# Patient Record
Sex: MALE | Race: WHITE | NOT HISPANIC OR LATINO | Employment: FULL TIME | URBAN - METROPOLITAN AREA
[De-identification: names, ages, dates, MRNs, and addresses within clinical notes are randomized per-mention and may not be internally consistent; named-entity substitution may affect disease eponyms.]

---

## 2017-03-23 ENCOUNTER — ALLSCRIPTS OFFICE VISIT (OUTPATIENT)
Dept: OTHER | Facility: OTHER | Age: 27
End: 2017-03-23

## 2017-03-23 DIAGNOSIS — E78.5 HYPERLIPIDEMIA: ICD-10-CM

## 2017-03-24 ENCOUNTER — TRANSCRIBE ORDERS (OUTPATIENT)
Dept: LAB | Facility: CLINIC | Age: 27
End: 2017-03-24

## 2017-04-06 ENCOUNTER — ALLSCRIPTS OFFICE VISIT (OUTPATIENT)
Dept: OTHER | Facility: OTHER | Age: 27
End: 2017-04-06

## 2017-07-06 DIAGNOSIS — E78.5 HYPERLIPIDEMIA: ICD-10-CM

## 2017-08-21 ENCOUNTER — ALLSCRIPTS OFFICE VISIT (OUTPATIENT)
Dept: OTHER | Facility: OTHER | Age: 27
End: 2017-08-21

## 2017-08-28 ENCOUNTER — ALLSCRIPTS OFFICE VISIT (OUTPATIENT)
Dept: OTHER | Facility: OTHER | Age: 27
End: 2017-08-28

## 2018-01-10 NOTE — PROGRESS NOTES
Assessment    1  Ingrown toenail (703 0) (L60 0)   2  Asthma (493 90) (J45 909)   3  ADHD (attention deficit hyperactivity disorder) (314 01) (F90 9)   4  Depression (311) (F32 9)   5  Need for prophylactic vaccination and inoculation against influenza (V04 81) (Z23)    Plan  Asthma    · Asmanex 30 Metered Doses 220 MCG/INH Inhalation Aerosol Powder Breath  Activated; One inhalation daily  Depression    · Pristiq 50 MG Oral Tablet Extended Release 24 Hour; 1po dialy  Health Maintenance    · Vyvanse 30 MG Oral Capsule; TAKE 1 CAPSULE DAILY IN THE MORNING  Ingrown toenail    · 2 - Grusso DPM, Sofia Press (Podiatry) Physician Referral  Consult  Status: Active  Requested  for: 15WIE0580  Care Summary provided  : Yes  Need for prophylactic vaccination and inoculation against influenza    · Fluzone Quadrivalent 0 5 ML Intramuscular Suspension  PMH: Asthma with acute exacerbation    · ProAir  (90 Base) MCG/ACT Inhalation Aerosol Solution; 2  PUFFS Q 4-6  HOURS PRN  SALVADOR    Discussion/Summary    #1 dyslipidemia  His 10y risk per [place] is 0%  No need for meds  #2 abnormal thyroid labs  Normal c/ today's result  #3 ADHD  Advised we do not prescribe meds for ADHD here, needs to f/u c/ his psychiatrist    #4 depression  Advised can fell his meds for the Pristiq, but he needs to f/u c/ his psychiatrist   #5 asthma  Needing refill on his meds  doing fine  The treatment plan was reviewed with the patient/guardian  The patient/guardian understands and agrees with the treatment plan   The patient, patient's family was counseled regarding instructions for management  Chief Complaint  Patient here with concerns over recent lab work  bh/lpn      History of Present Illness  26y/o wm presents to office for f/u lab results  he also had some other issues  Rt big toe infected, has ingrown toenail  ADHD, depression - been seeing a psychiatrist for this  was supposed to f/u q 2-3mo's, but not had gone back for 4-5mo's  requesting refill on his meds  Asthma - doing fine on meds  Review of Systems    Constitutional: no fever and no chills  Cardiovascular: no chest pain  Respiratory: no shortness of breath  Active Problems    1  Asthma (493 90) (J45 909)   2  Hyperlipemia (272 4) (E78 5)   3  Pharyngitis (462) (J02 9)    Past Medical History    1  History of Acute bronchitis (466 0) (J20 9)   2  History of Asthma with acute exacerbation (493 92) (J45 901)   3  History of Bronchitis (490) (J40)   4  History of Right knee pain (719 46) (M25 561)   5  History of Sore throat (462) (J02 9)    Family History    1  No pertinent family history    2  No pertinent family history    Social History    · Never A Smoker    Current Meds   1  Asmanex 30 Metered Doses 220 MCG/INH Inhalation Aerosol Powder Breath Activated; One inhalation daily; Therapy: 69DJH4901 to (Last AJ:49WQS7191)  Requested for: 76ILE4123 Ordered   2  CVS Loratadine 10 MG Oral Tablet; TAKE 1 TABLET DAILY; Therapy: 27IVH1297 to Recorded   3  Pristiq 100 MG Oral Tablet Extended Release 24 Hour; Therapy: (Demaris Aidan) to Recorded   4  ProAir  (90 Base) MCG/ACT Inhalation Aerosol Solution; 2  PUFFS Q 4-6 HOURS   PRN  SALVADOR;   Therapy: 49ULD4287 to (Last Rx:89Mnm2779)  Requested for: 24GDD2773 Ordered   5  Vyvanse 30 MG Oral Capsule; TAKE 1 CAPSULE DAILY IN THE MORNING; Therapy: 16CQE9549 to (Evaluate:23Jan2016); Last Rx:02Lon7792 Ordered    Allergies    1  Cefzil TABS    Vitals  Vital Signs [Data Includes: Current Encounter]    Recorded: 77IHA8386 02:31PM   Temperature 98 7 F   Heart Rate 80   Respiration 20   Systolic 295   Diastolic 80   Height 5 ft 8 5 in   Weight 200 lb    BMI Calculated 29 97   BSA Calculated 2 05     Physical Exam    Constitutional   General appearance: No acute distress, well appearing and well nourished  Pulmonary   Respiratory effort: No increased work of breathing or signs of respiratory distress    Respiratory rate: normal  Assessment of respiratory effort revealed normal rhythm and effort, no accessory muscle use, no air hunger and no distress  Skin   Examination for skin lesions: Abnormal   (Rt toe, lat and proximal to the nail, erythematous, tender to touch  ingrown toenail) Skin Lesions:   Psychiatric   Judgment and insight: Normal     Orientation to person, place and time: Normal     Recent and remote memory: Intact      Mood and affect: Normal        Results/Data  Encounter Results   [place] 30GAX3845 02:39PM Heber Rudolph     Test Name Result Flag Reference   [place] - Ten Year 0 %         Signatures   Electronically signed by : ANABELA Rodriguez ; Jan 22 2016  6:40PM EST                       (Author)

## 2018-01-11 NOTE — PROGRESS NOTES
Assessment    1  Difficulty walking (719 7) (R26 2)   2  Ingrowing nail (703 0) (L60 0)   3  Paronychia of toe (681 11) (L03 039)    Plan    · Neomycin-Polymyxin-HC 3 5-16086-2 Otic Suspension; 3 drops tid with bandage  to affected area   · Schedule Surgery Treatment  Procedure  Status: Hold For - Scheduling  Requested for:  47GMA9822   · It is important to take good care of your feet ; Status:Complete;   Done: 33AQO6144  10:21AM   · You can treat and prevent ingrown toenails ; Status:Complete;   Done: 21BDU1552  10:21AM    Discussion/Summary  Possible side effects of new medications were reviewed with the patient/guardian today  The treatment plan was reviewed with the patient/guardian  The patient/guardian understands and agrees with the treatment plan   The patient was counseled regarding diagnostic results, instructions for management, prognosis, patient and family education, risks and benefits of treatment options, importance of compliance with treatment  Chief Complaint  Patient has infection on left great toe for about a month  History of Present Illness  HPI: Patient is seen on referral for chronic ingrown toenail  Agent is presently on antibiotics  He has had multiple episodes of ingrown toenail both big toes      Review of Systems    Constitutional: no fever and no chills  Cardiovascular: no chest pain  Respiratory: no shortness of breath  Active Problems    1  ADHD (attention deficit hyperactivity disorder) (314 01) (F90 9)   2  Asthma (493 90) (J45 909)   3  Depression (311) (F32 9)   4  Hyperlipemia (272 4) (E78 5)   5  Ingrown toenail (703 0) (L60 0)   6  Need for prophylactic vaccination and inoculation against influenza (V04 81) (Z23)   7   Pharyngitis (462) (J02 9)    Past Medical History    · History of Acute bronchitis (466 0) (J20 9)   · History of Asthma with acute exacerbation (493 92) (J45 901)   · History of Bronchitis (490) (J40)   · History of Right knee pain (719 46) (M25 561)   · History of Sore throat (462) (J02 9)    Family History    · No pertinent family history    · No pertinent family history    Social History    · Never A Smoker    Current Meds   1  Asmanex 30 Metered Doses 220 MCG/INH Inhalation Aerosol Powder Breath Activated; One inhalation daily; Therapy: 77QSH2348 to (Last Sydell Clinton)  Requested for: 20DAA9412 Ordered   2  CVS Loratadine 10 MG Oral Tablet; TAKE 1 TABLET DAILY; Therapy: 82HLE4179 to Recorded   3  Fish Oil CAPS; Therapy: (Recorded:80Loj7587) to Recorded   4  Pristiq 50 MG Oral Tablet Extended Release 24 Hour; 1po dialy  Requested for:   17AUL9614; Last Rx:22Jan2016 Ordered   5  ProAir  (90 Base) MCG/ACT Inhalation Aerosol Solution; 2  PUFFS Q 4-6 HOURS   PRN  SALVADOR;   Therapy: 26LXO4286 to (Last Rx:22Jan2016)  Requested for: 38ELN9722 Ordered   6  SMZ-TMP -160 MG Oral Tablet; take 1 tablet twice daily with food; Therapy: 18LIW1939 to (Evaluate:77Afx0842)  Requested for: 43MGY9704; Last   Rx:27Jan2016 Ordered   7  Vitamin D TABS; Therapy: (Recorded:05Feb2016) to Recorded   8  Vyvanse 30 MG Oral Capsule; TAKE 1 CAPSULE DAILY IN THE MORNING; Therapy: 23YHS0549 to (Evaluate:18Ejg7125); Last Rx:26Jan2016 Ordered    Allergies    1  Cefzil TABS    Vitals   Recorded: 49ESS5455 29:21VE   Systolic 175   Diastolic 73   Height 5 ft 8 5 in   Weight 200 lb    BMI Calculated 29 97   BSA Calculated 2 05     Physical Exam  Left Foot: Appearance: Normal except as noted: excessive pronation and pes planus  Tenderness: None except the great toe  Right Foot: Appearance: Normal except as noted: pes planus  Tenderness: None except the great toe  Neurological Exam: performed  Light touch was intact bilaterally  Vibratory sensation was intact bilaterally  Response to monofilament test was intact bilaterally  Deep tendon reflexes: patellar reflex present bilaterally and achilles reflex present bilaterally     Vascular Exam: performed Dorsalis pedis pulses were present bilaterally  Posterior tibial pulses were present bilaterally  Elevation Pallor: absent bilaterally  Dependence rubor was absent bilaterally  Edema: none  Toenails: All of the toenails were elongated and hypertrophied  Both first toenails were shown to have subungual debris, but not ingrown  Hyperkeratosis: present on both first toes        Procedure  patient will need nail procedure      Signatures   Electronically signed by : Olamide Smiley DPM; Feb 5 2016 10:22AM EST                       (Author)

## 2018-01-12 VITALS
HEIGHT: 69 IN | TEMPERATURE: 98.7 F | BODY MASS INDEX: 28.44 KG/M2 | DIASTOLIC BLOOD PRESSURE: 90 MMHG | SYSTOLIC BLOOD PRESSURE: 130 MMHG | OXYGEN SATURATION: 98 % | WEIGHT: 192 LBS | HEART RATE: 84 BPM

## 2018-01-12 NOTE — PROGRESS NOTES
Chief Complaint  Patient here from [facility] for PPD placement  Placed in left forearm      Active Problems    1  ADHD (attention deficit hyperactivity disorder) (314 01) (F90 9)   2  Asthma (493 90) (J45 909)   3  Depression (311) (F32 9)   4  Difficulty walking (719 7) (R26 2)   5  Encounter for PPD test (V74 1) (Z11 1)   6  Hyperlipemia (272 4) (E78 5)   7  Ingrowing nail (703 0) (L60 0)   8  Ingrown toenail (703 0) (L60 0)   9  Need for prophylactic vaccination and inoculation against influenza (V04 81) (Z23)   10  Paronychia of toe (681 11) (L03 039)   11  Pharyngitis (462) (J02 9)    Current Meds   1  Asmanex 30 Metered Doses 220 MCG/INH Inhalation Aerosol Powder Breath Activated; One inhalation daily; Therapy: 77VFU4028 to (Last Ja Reges)  Requested for: 83ISM4722 Ordered   2  CVS Loratadine 10 MG Oral Tablet; TAKE 1 TABLET DAILY; Therapy: 32IIL4103 to Recorded   3  Fish Oil CAPS; Therapy: (Recorded:27Dvj5254) to Recorded   4  Mupirocin 2 % External Ointment; APPLY 1 INCH Daily; Therapy: 39IHY5461 to (Complete:30Mar2016)  Requested for: 96PXZ3105; Last   Rx:59Utb8001 Ordered   5  Pristiq 100 MG Oral Tablet Extended Release 24 Hour; 1po daily  Requested for:   15RZP9004; Last Rx:53Hqt7175 Ordered   6  ProAir  (90 Base) MCG/ACT Inhalation Aerosol Solution; 2  PUFFS Q 4-6   HOURS PRN  SALVADOR;   Therapy: 91UBS3127 to (Last Rx:22Jan2016)  Requested for: 10CCG9140 Ordered   7  SMZ-TMP -160 MG Oral Tablet; take 1 tablet twice daily with food; Therapy: 79JQB9157 to (Evaluate:26Upe5376)  Requested for: 00AIG3785; Last   Rx:47Xva5315 Ordered   8  Vitamin D TABS; Therapy: (Recorded:24Zci2463) to Recorded   9  Vyvanse 30 MG Oral Capsule; TAKE 1 CAPSULE DAILY IN THE MORNING; Therapy: 74LRS2058 to (Evaluate:89Oft5643); Last Rx:26Jan2016 Ordered    Allergies    1  Cefzil TABS    Assessment    1   Encounter for PPD test (V74 1) (Z11 1)    Plan  Encounter for PPD test    · PPD    Future Appointments    Date/Time Provider Specialty Site   03/04/2016 01:00 PM ANABELA Cortez   Family Medicine [address]     Signatures   Electronically signed by : ANABELA Ahumada ; Feb 23 2016 10:57AM EST                       (Author)

## 2018-01-13 NOTE — PROGRESS NOTES
Chief Complaint  Patient here for PPD read, reading negative 0 00mm bh/lpn      Active Problems    1  ADHD (attention deficit hyperactivity disorder) (314 01) (F90 9)   2  Asthma (493 90) (J45 909)   3  Depression (311) (F32 9)   4  Difficulty walking (719 7) (R26 2)   5  Encounter for PPD test (V74 1) (Z11 1)   6  Hyperlipemia (272 4) (E78 5)   7  Ingrowing nail (703 0) (L60 0)   8  Ingrown toenail (703 0) (L60 0)   9  Need for prophylactic vaccination and inoculation against influenza (V04 81) (Z23)   10  Paronychia of toe (681 11) (L03 039)   11  Pharyngitis (462) (J02 9)    Current Meds   1  Asmanex 30 Metered Doses 220 MCG/INH Inhalation Aerosol Powder Breath Activated; One inhalation daily; Therapy: 98CXW6311 to (Last Thanh Piedad)  Requested for: 63ZEO9638 Ordered   2  CVS Loratadine 10 MG Oral Tablet; TAKE 1 TABLET DAILY; Therapy: 52PIB7805 to Recorded   3  Fish Oil CAPS; Therapy: (Recorded:46Bvx8138) to Recorded   4  Mupirocin 2 % External Ointment; APPLY 1 INCH Daily; Therapy: 54QDQ9455 to (Complete:30Mar2016)  Requested for: 36YXZ4833; Last   Rx:31Ril1514 Ordered   5  Pristiq 100 MG Oral Tablet Extended Release 24 Hour; 1po daily  Requested for:   17XLQ0538; Last Rx:09Tvf7037 Ordered   6  ProAir  (90 Base) MCG/ACT Inhalation Aerosol Solution; 2  PUFFS Q 4-6   HOURS PRN  SALVADOR;   Therapy: 89WUL8081 to (Last Rx:22Jan2016)  Requested for: 76ZUF9813 Ordered   7  SMZ-TMP -160 MG Oral Tablet; take 1 tablet twice daily with food; Therapy: 43TIN4099 to (Evaluate:72Aia7416)  Requested for: 32BNS8900; Last   Rx:00Koh3791 Ordered   8  Vitamin D TABS; Therapy: (Recorded:20Sgy0874) to Recorded   9  Vyvanse 30 MG Oral Capsule; TAKE 1 CAPSULE DAILY IN THE MORNING; Therapy: 31XSE6058 to (Evaluate:46Sni0727); Last Rx:26Jan2016 Ordered    Allergies    1   Cefzil TABS    Plan  Encounter for PPD test    · PPD    Future Appointments    Date/Time Provider Specialty Site   03/04/2016 01:00 PM ANABELA Pickett   Family Medicine [address]     Signatures   Electronically signed by : ANABELA Wade ; Feb 25 2016  1:00PM EST                       (Author)

## 2018-01-14 VITALS
BODY MASS INDEX: 29.18 KG/M2 | OXYGEN SATURATION: 98 % | SYSTOLIC BLOOD PRESSURE: 108 MMHG | HEIGHT: 69 IN | WEIGHT: 197 LBS | HEART RATE: 76 BPM | DIASTOLIC BLOOD PRESSURE: 60 MMHG

## 2018-01-14 NOTE — MISCELLANEOUS
Message  Return to work or school:  08/21/2017   He is able to return to work on  08/25/2017       [facility]  Signatures   Electronically signed by : ANITA Alanis; Aug 21 2017 10:35AM EST                       (Author)    Electronically signed by : ANITA Alanis;  Aug 23 2017  8:53AM EST                       (Author)

## 2018-01-15 NOTE — MISCELLANEOUS
Message   Recorded as Task   Date: 08/21/2017 10:58 AM, Created By: Sanjana Rojas   Task Name: Med Renewal Request   Assigned To: Mee Resendiz   Regarding Patient: Celine Bell, Status: Active   CommentCarrieliliam WestbrookCurtis - 21 Aug 2017 10:58 AM     TASK CREATED  Caller: Self; Renew Medication; [phone] (Home); [phone] (Work)  PT CALLED -CHKED MED CHART- [facility] DID NOT [address] [facility] TO PHARMACY     CAN VICENTE Cowan - 21 Aug 2017 11:05 AM     TASK EDITED  Re-sent all medications  Ellie Rondon - 21 Aug 2017 11:05 AM     TASK COMPLETED   Rimma Diehl - 23 Aug 2017 8:09 AM     TASK REACTIVATED   Rimma Diehl - 23 Aug 2017 8:10 AM     TASK REASSIGNED: Previously Assigned To Ellie Rondon  PT IS STILL EXPERIENCING SAME COUGH AND GETTNG WORSE WOULD LIKE SOMETING ELSE TO TAKE INSTEAD OF WHAT WAS CALLED IN? T/c to patient at [phone]  Report still coughing and unable to sleep due to cough  Started all medications on Monday as prescribed  To stop Tessalon Perles and begin Promethazine with Codeine  Will come pit of medication today  Work note extended until Friday  Plan  Acute bronchitis    · Benzonatate 100 MG Oral Capsule (Tessalon Perles)   · Promethazine-Codeine 6 25-10 MG/5ML Oral Syrup; TAKE 5 ML EVERY 4 TO 6  HOURS AS NEEDED FOR COUGH  Moderate persistent asthma with acute exacerbation    · Follow-up visit in 1 week Evaluation and Treatment  Follow-up  Status: Complete  Done:  25Lqd6565    Signatures   Electronically signed by : ANITA Salazar;  Aug 23 2017  8:56AM EST                       (Author)

## 2018-01-16 NOTE — PROGRESS NOTES
Assessment    1  ADHD (attention deficit hyperactivity disorder) (314 01) (F90 9)   2  Depression (311) (F32 9)    Plan  ADHD (attention deficit hyperactivity disorder), Depression    · Psychiatry Referral Other Physician Referral  Consult  Status: Active  Requested for:  53OSL3014  are Referring to a non- Preferred Provider : Scheduling access issues  Care Summary provided  : Yes  Depression    · Pristiq 100 MG Oral Tablet Extended Release 24 Hour; 1po daily  Health Maintenance    · Vyvanse 30 MG Oral Capsule; TAKE 1 CAPSULE DAILY IN THE MORNING    Discussion/Summary    Patient advised that our practice does not manage ADHD and he will need to find a psychiatrist to manage his Vyvanse  I will give him a one-month supply for the time being  No further refills will be given  He should reach out to his insurance company for a list of psychiatrists in his area  Patient verbalizes understanding and agreement of the same  I advised him that the psychiatrist can also manage his Pristiq  We can manage his other medications  Possible side effects of new medications were reviewed with the patient/guardian today  The treatment plan was reviewed with the patient/guardian  The patient/guardian understands and agrees with the treatment plan   The patient was counseled regarding instructions for management, risk factor reductions, patient and family education, impressions, risks and benefits of treatment options, importance of compliance with treatment  Chief Complaint  F/U depression and maintenance medications; flu inj  kss,cma      History of Present Illness  The patient states his depression has depression and ADHD well managed on vyvanse and pristiq, but been stable since the last visit  Comorbid Illnesses: ADHD  He has had no significant interval events  Interval Symptoms: The patient is currently asymptomatic  Associated symptoms include:  No associated symptoms are reported     Social Support: the patient has good social support  Medications: the patient is adherent with his medication regimen  He denies medication side effects  Medication(s): a SNRI  Review of Systems    Constitutional: feeling tired, but no fever and no chills  Cardiovascular: No complaints of slow heart rate, no fast heart rate, no chest pain, no palpitations, no leg claudication, no lower extremity  Respiratory: No complaints of shortness of breath, no wheezing, no cough, no SOB on exertion, no orthopnea or PND  Integumentary: no rashes  Neurological: No compliants of headache, no confusion, no convulsions, no numbness or tingling, no dizziness or fainting, no limb weakness, no difficulty walking  Psychiatric: Is not suicidal, no sleep disturbances, no anxiety or depression, no change in personality, no emotional problems  Over the past 2 weeks, how often have you been bothered by the following problems? 1 ) Little interest or pleasure in doing things? Several days  2 ) Feeling down, depressed or hopeless? Several days  3 ) Trouble falling asleep or sleeping too much? Not at all    4 ) Feeling tired or having little energy? Several days  5 ) Poor appetite or overeating? Not at all    6 ) Feeling bad about yourself, or that you are a failure, or have let yourself or your family down? Not at all    7 ) Trouble concentrating on things, such as reading a newspaper or watching television? Not at all    8 ) Moving or speaking so slowly that other people could have noticed, or the opposite, moving or speaking faster than usual? Not at all    9 ) Thoughts that you would be better off dead or of hurting yourself in some way? Not at all  Score 3      Active Problems    1  ADHD (attention deficit hyperactivity disorder) (314 01) (F90 9)   2  Adult body mass index 28 0-28 9 (V85 24) (Z68 28)   3  Asthma (493 90) (J45 909)   4  Depression (311) (F32 9)   5  Difficulty walking (719 7) (R26 2)   6   Encounter for PPD test (V74 1) (Z11 1)   7  Hyperlipemia (272 4) (E78 5)   8  Ingrowing nail (703 0) (L60 0)   9  Ingrown toenail (703 0) (L60 0)   10  Paronychia of toe (681 11) (L03 039)    Past Medical History    1  History of Acute bronchitis (466 0) (J20 9)   2  History of Asthma with acute exacerbation (493 92) (J45 901)   3  History of Bronchitis (490) (J40)   4  History of pharyngitis (V12 69) (Z87 09)   5  History of Right knee pain (719 46) (M25 561)   6  History of Sore throat (462) (J02 9)    The active problems and past medical history were reviewed and updated today  Family History  Mother    1  No pertinent family history  Father    2  No pertinent family history    The family history was reviewed and updated today  Social History    · Never A Smoker  The social history was reviewed and updated today  Current Meds   1  Asmanex 30 Metered Doses 220 MCG/INH Inhalation Aerosol Powder Breath Activated; One inhalation daily; Therapy: 71AQB5965 to (Last Ezzard Aus)  Requested for: 71CRH5234 Ordered   2  CVS Loratadine 10 MG Oral Tablet; TAKE 1 TABLET DAILY; Therapy: 22UPU7239 to Recorded   3  Fish Oil CAPS; Therapy: (Recorded:05Feb2016) to Recorded   4  Pristiq 100 MG Oral Tablet Extended Release 24 Hour; 1po daily  Requested for:   08Apr2016; Last Rx:08Apr2016 Ordered   5  Pristiq 100 MG Oral Tablet Extended Release 24 Hour; 1po daily; Last Rx:04Mar2016   Ordered   6  ProAir  (90 Base) MCG/ACT Inhalation Aerosol Solution; 2  PUFFS Q 4-6 HOURS   PRN  SALVADOR;   Therapy: 94CWC6993 to (Last Rx:22Jan2016)  Requested for: 28ADX8641 Ordered   7  SMZ-TMP -160 MG TABS; take 1 tablet twice daily with food; Therapy: 99XSX8128 to (Evaluate:30Ico6122)  Requested for: 51RPN3191; Last   Rx:43Gvn3601 Ordered   8  Vitamin D TABS; Therapy: (Recorded:61Gtk6078) to Recorded   9  Vyvanse 30 MG Oral Capsule; TAKE 1 CAPSULE DAILY IN THE MORNING; Therapy: 65OSC2136 to (Evaluate:35Vhj2442);  Last Compa Earnest Ordered    The medication list was reviewed and updated today  Allergies    1  Cefzil TABS    Vitals  Vital Signs    Recorded: 66TNE9190 07:39PX   Systolic 698   Diastolic 80   Heart Rate 76   Respiration 16   Temperature 97 8 F   Height 5 ft 9 25 in   Weight 199 lb    BMI Calculated 29 17   BSA Calculated 2 07     Physical Exam    Constitutional   General appearance: No acute distress, well appearing and well nourished  Eyes   Conjunctiva and lids: No swelling, erythema, or discharge  Pupils and irises: Equal, round and reactive to light  Pulmonary   Respiratory effort: No increased work of breathing or signs of respiratory distress  Auscultation of lungs: Clear to auscultation, equal breath sounds bilaterally, no wheezes, no rales, no rhonci  Cardiovascular   Auscultation of heart: Normal rate and rhythm, normal S1 and S2, without murmurs  Musculoskeletal   Gait and station: Normal     Skin   Skin and subcutaneous tissue: Normal without rashes or lesions  Psychiatric   Orientation to person, place and time: Normal     Mood and affect: Normal          Attending Note  Collaborating Note: I agree with the Advanced Practitioner note        Signatures   Electronically signed by : MARCO Ford; Sep  9 2016  1:24PM EST                       (Author)    Electronically signed by : Aurora Matos DO; Sep  9 2016  1:30PM EST                       (Author)

## 2018-01-18 NOTE — PROGRESS NOTES
Assessment    1  Encounter for preventive health examination (V70 0) (Z00 00)   2  Adult body mass index 28 0-28 9 (V85 24) (Z68 28)    Plan  Depression    · Pristiq 100 MG Oral Tablet Extended Release 24 Hour; 1po daily   · Pristiq 100 MG Oral Tablet Extended Release 24 Hour; 1po daily  Health Maintenance    · Vyvanse 30 MG Oral Capsule; TAKE 1 CAPSULE DAILY IN THE MORNING    Discussion/Summary  Impression: health maintenance visit  Currently, he eats a poor diet  Advice and education were given regarding nutrition, aerobic exercise, weight bearing exercise and weight loss  Patient discussion: discussed with the patient  Patient will seek psychiatric help in his new residence  Patient is overweight  Diet and exercise recommended  Reviewed recent blood tests done February 2016 including cholesterol level  The patient was counseled regarding diagnostic results, instructions for management, risk factor reductions, prognosis, risks and benefits of treatment options, importance of compliance with treatment  Chief Complaint  here for physical      History of Present Illness  HM, Adult Male: The patient is being seen for a health maintenance evaluation  The last health maintenance visit was 1 year(s) ago  Social History: He is unmarried  Work status: working full time  The patient has never smoked cigarettes  He reports rare alcohol use  He has never used illicit drugs  General Health: The patient's health since the last visit is described as fair  He does not have regular dental visits  He denies vision problems  He denies hearing loss  Immunizations status: up to date  Lifestyle:  He does not have a healthy diet  He has weight concerns  He does not exercise regularly  He does not use tobacco  He consumes alcohol  He denies drug use  Reproductive health:  the patient is not sexually active     Screening:      Review of Systems    Constitutional: No fever or chills, feels well, no tiredness, no recent weight gain or weight loss, no recent weight gain and no recent weight loss  Eyes: No complaints of eye pain, no red eyes, no discharge from eyes, no itchy eyes  ENT: no complaints of earache, no hearing loss, no nosebleeds, no nasal discharge, no sore throat, no hoarseness  Cardiovascular: No complaints of slow heart rate, no fast heart rate, no chest pain, no palpitations, no leg claudication, no lower extremity  Respiratory: No complaints of shortness of breath, no wheezing, no cough, no SOB on exertion, no orthopnea or PND  Gastrointestinal: blood on toilet paper today  Genitourinary: No complaints of dysuria, no incontinence, no hesitancy, no nocturia, no genital lesion, no testicular pain  Musculoskeletal: No complaints of arthralgia, no myalgias, no joint swelling or stiffness, no limb pain or swelling  Integumentary: No complaints of skin rash or skin lesions, no itching, no skin wound, no dry skin  Neurological: no headache, no confusion, no convulsions and no difficulty walking  Psychiatric: depression and has no active psych  Endocrine: No complaints of proptosis, no hot flashes, no muscle weakness, no erectile dysfunction, no deepening of the voice, no feelings of weakness  Hematologic/Lymphatic: No complaints of swollen glands, no swollen glands in the neck, does not bleed easily, no easy bruising  Active Problems    1  ADHD (attention deficit hyperactivity disorder) (314 01) (F90 9)   2  Asthma (493 90) (J45 909)   3  Depression (311) (F32 9)   4  Difficulty walking (719 7) (R26 2)   5  Encounter for PPD test (V74 1) (Z11 1)   6  Hyperlipemia (272 4) (E78 5)   7  Ingrowing nail (703 0) (L60 0)   8  Ingrown toenail (703 0) (L60 0)   9  Need for prophylactic vaccination and inoculation against influenza (V04 81) (Z23)   10   Paronychia of toe (681 11) (L03 039)    Past Medical History    · History of Acute bronchitis (466 0) (J20 9)   · History of Asthma with acute exacerbation (493 92) (J45 901)   · History of Bronchitis (490) (J40)   · History of pharyngitis (V12 69) (Z87 09)   · History of Right knee pain (719 46) (M25 561)   · History of Sore throat (462) (J02 9)    Family History    · No pertinent family history    · No pertinent family history    Social History    · Never A Smoker    Current Meds   1  Asmanex 30 Metered Doses 220 MCG/INH Inhalation Aerosol Powder Breath Activated; One inhalation daily; Therapy: 72GPL0630 to (Last Kay Flower)  Requested for: 66GMK9352 Ordered   2  [facility] Loratadine 10 MG Oral Tablet; TAKE 1 TABLET DAILY; Therapy: 56OQN2168 to Recorded   3  Fish Oil CAPS; Therapy: (Recorded:05Feb2016) to Recorded   4  Mupirocin 2 % External Ointment; APPLY 1 INCH Daily; Therapy: 19JPI6504 to (Complete:30Mar2016)  Requested for: 39THK5607; Last   Rx:[id number] Ordered   5  Pristiq 100 MG Oral Tablet Extended Release 24 Hour; 1po daily  Requested for:   92GYK9260; Last Rx:17Taa6058 Ordered   6  ProAir  (90 Base) MCG/ACT Inhalation Aerosol Solution; 2  PUFFS Q 4-6   HOURS PRN  SALVADOR;   Therapy: 35EDN9821 to (Last Rx:22Jan2016)  Requested for: 22PUM8592 Ordered   7  SMZ-TMP -160 MG Oral Tablet; take 1 tablet twice daily with food; Therapy: 01WPL9376 to (Evaluate:36Bkb3723)  Requested for: 97NYR4537; Last   Rx:19Feb2016 Ordered   8  Vitamin D TABS; Therapy: (Recorded:35Ski7189) to Recorded   9  Vyvanse 30 MG Oral Capsule; TAKE 1 CAPSULE DAILY IN THE MORNING; Therapy: 53FYU1959 to (Evaluate:42Afx4290); Last Rx:26Jan2016 Ordered    Allergies    1  Cefzil TABS    Vitals   Recorded: 31YLD3789 01:12PM   Temperature 97 7 F   Heart Rate 80   Systolic 771   Diastolic 88   Height 5 ft 9 25 in   Weight 196 lb    BMI Calculated 28 73   BSA Calculated 2 05     Physical Exam    Constitutional   General appearance: Abnormal   comfortable and overweight  Eyes   Conjunctiva and lids: No erythema, swelling or discharge      Pupils and irises: Equal, round, reactive to light  Ophthalmoscopic examination: Normal fundi and optic discs  Ears, Nose, Mouth, and Throat   External inspection of ears and nose: Normal     Otoscopic examination: Tympanic membranes translucent with normal light reflex  Canals patent without erythema  Hearing: Normal     Nasal mucosa, septum, and turbinates: Normal without edema or erythema  Lips, teeth, and gums: Normal, good dentition  Oropharynx: Normal with no erythema, edema, exudate or lesions  Neck   Neck: Supple, symmetric, trachea midline, no masses  Thyroid: Normal, no thyromegaly  Pulmonary   Respiratory effort: No increased work of breathing or signs of respiratory distress  Percussion of chest: Normal     Palpation of chest: Normal     Auscultation of lungs: Clear to auscultation  Cardiovascular   Palpation of heart: Normal PMI, no thrills  Auscultation of heart: Normal rate and rhythm, normal S1 and S2, no murmurs  Carotid pulses: 2+ bilaterally  Abdominal aorta: Normal     Pedal pulses: 2+ bilaterally  Examination of extremities for edema and/or varicosities: Normal     Chest   Breasts: Normal, no dimpling or skin changes appreciated  Palpation of breasts and axillae: Normal, no masses palpated  Chest: Normal     Abdomen   Abdomen: Non-tender, no masses  Liver and spleen: No hepatomegaly or splenomegaly  Examination for hernias: No hernias appreciated  No right inguinal hernia was palpated  No left inguinal hernia was palpated  Anus, perineum, and rectum: Normal sphincter tone, no masses, no prolapse  Stool sample for occult blood: Negative  Genitourinary   Scrotal contents: Normal testes, no masses  Penis: Normal, no lesions  Examination of the circumcised penis showed  Lymphatic   Palpation of lymph nodes in neck: No lymphadenopathy  Palpation of lymph nodes in axillae: No lymphadenopathy      Musculoskeletal   Gait and station: Normal  Inspection/palpation of digits and nails: Normal without clubbing or cyanosis  Inspection/palpation of joints, bones, and muscles: Normal     Muscle strength/tone: Normal     Skin   Skin and subcutaneous tissue: Normal without rashes or lesions  Neurologic   Cranial nerves: Cranial nerves 2-12 intact  Reflexes: 2+ and symmetric  Coordination: Normal finger to nose and heel to shin      Psychiatric   Judgment and insight: Normal     Mood and affect: Normal        Future Appointments    Date/Time Provider Specialty Site   09/06/2016 01:30 PM Trino Henriquez,  Internal Medicine [address]     Signatures   Electronically signed by : ANABELA Barajas ; Mar  4 2016  2:47PM EST                       (Author)

## 2018-01-22 VITALS
HEART RATE: 85 BPM | SYSTOLIC BLOOD PRESSURE: 118 MMHG | OXYGEN SATURATION: 98 % | DIASTOLIC BLOOD PRESSURE: 70 MMHG | WEIGHT: 198 LBS | TEMPERATURE: 98.4 F | BODY MASS INDEX: 29.33 KG/M2 | HEIGHT: 69 IN

## 2020-04-07 ENCOUNTER — TELEMEDICINE (OUTPATIENT)
Dept: FAMILY MEDICINE CLINIC | Facility: CLINIC | Age: 30
End: 2020-04-07
Payer: COMMERCIAL

## 2020-04-07 VITALS — TEMPERATURE: 98.6 F | HEIGHT: 68 IN | BODY MASS INDEX: 30.69 KG/M2 | WEIGHT: 202.5 LBS

## 2020-04-07 DIAGNOSIS — R50.9 FEVER, UNSPECIFIED FEVER CAUSE: ICD-10-CM

## 2020-04-07 DIAGNOSIS — J45.909 UNCOMPLICATED ASTHMA, UNSPECIFIED ASTHMA SEVERITY, UNSPECIFIED WHETHER PERSISTENT: Primary | ICD-10-CM

## 2020-04-07 PROCEDURE — 3008F BODY MASS INDEX DOCD: CPT | Performed by: FAMILY MEDICINE

## 2020-04-07 PROCEDURE — 99213 OFFICE O/P EST LOW 20 MIN: CPT | Performed by: FAMILY MEDICINE

## 2020-04-07 RX ORDER — ALBUTEROL SULFATE 2.5 MG/3ML
2.5 SOLUTION RESPIRATORY (INHALATION) EVERY 6 HOURS PRN
Qty: 25 VIAL | Refills: 3 | Status: SHIPPED | OUTPATIENT
Start: 2020-04-07

## 2020-04-07 RX ORDER — ALBUTEROL SULFATE 2.5 MG/3ML
2.5 SOLUTION RESPIRATORY (INHALATION) EVERY 6 HOURS PRN
COMMUNITY
End: 2020-04-07 | Stop reason: SDUPTHER

## 2020-04-07 RX ORDER — ALBUTEROL SULFATE 90 UG/1
2 AEROSOL, METERED RESPIRATORY (INHALATION) EVERY 6 HOURS PRN
COMMUNITY
End: 2020-04-07 | Stop reason: SDUPTHER

## 2020-04-07 RX ORDER — DESVENLAFAXINE 100 MG/1
100 TABLET, EXTENDED RELEASE ORAL DAILY
COMMUNITY

## 2020-04-07 RX ORDER — ALBUTEROL SULFATE 90 UG/1
2 AEROSOL, METERED RESPIRATORY (INHALATION) EVERY 6 HOURS PRN
Qty: 1 INHALER | Refills: 1 | Status: SHIPPED | OUTPATIENT
Start: 2020-04-07 | End: 2021-11-30

## 2020-12-10 ENCOUNTER — OFFICE VISIT (OUTPATIENT)
Dept: FAMILY MEDICINE CLINIC | Facility: CLINIC | Age: 30
End: 2020-12-10
Payer: COMMERCIAL

## 2020-12-10 VITALS
OXYGEN SATURATION: 97 % | DIASTOLIC BLOOD PRESSURE: 64 MMHG | HEART RATE: 75 BPM | BODY MASS INDEX: 30.19 KG/M2 | TEMPERATURE: 97.3 F | HEIGHT: 68 IN | WEIGHT: 199.2 LBS | SYSTOLIC BLOOD PRESSURE: 116 MMHG

## 2020-12-10 DIAGNOSIS — Z13.220 ENCOUNTER FOR SCREENING FOR LIPID DISORDER: ICD-10-CM

## 2020-12-10 DIAGNOSIS — R10.9 FLANK PAIN: Primary | ICD-10-CM

## 2020-12-10 PROCEDURE — 1036F TOBACCO NON-USER: CPT | Performed by: FAMILY MEDICINE

## 2020-12-10 PROCEDURE — 3008F BODY MASS INDEX DOCD: CPT | Performed by: FAMILY MEDICINE

## 2020-12-10 PROCEDURE — 99214 OFFICE O/P EST MOD 30 MIN: CPT | Performed by: FAMILY MEDICINE

## 2020-12-10 RX ORDER — LORATADINE 10 MG/1
1 TABLET ORAL DAILY
COMMUNITY
Start: 2012-10-17

## 2020-12-10 RX ORDER — MOMETASONE FUROATE 220 UG/1
INHALANT RESPIRATORY (INHALATION) DAILY
COMMUNITY
Start: 2015-01-23

## 2020-12-10 RX ORDER — VITAMIN B COMPLEX
TABLET ORAL
COMMUNITY

## 2020-12-22 LAB
ALBUMIN SERPL-MCNC: 4.4 G/DL (ref 4–5)
ALBUMIN/GLOB SERPL: 2.1 {RATIO} (ref 1.2–2.2)
ALP SERPL-CCNC: 65 IU/L (ref 39–117)
ALT SERPL-CCNC: 30 IU/L (ref 0–44)
APPEARANCE UR: CLEAR
AST SERPL-CCNC: 22 IU/L (ref 0–40)
BACTERIA URNS QL MICRO: NORMAL
BILIRUB SERPL-MCNC: 0.6 MG/DL (ref 0–1.2)
BILIRUB UR QL STRIP: NEGATIVE
BUN SERPL-MCNC: 13 MG/DL (ref 6–20)
BUN/CREAT SERPL: 13 (ref 9–20)
CALCIUM SERPL-MCNC: 9.1 MG/DL (ref 8.7–10.2)
CHLORIDE SERPL-SCNC: 101 MMOL/L (ref 96–106)
CHOLEST SERPL-MCNC: 201 MG/DL (ref 100–199)
CO2 SERPL-SCNC: 25 MMOL/L (ref 20–29)
COLOR UR: YELLOW
CREAT SERPL-MCNC: 1.01 MG/DL (ref 0.76–1.27)
EPI CELLS #/AREA URNS HPF: NORMAL /HPF (ref 0–10)
GLOBULIN SER-MCNC: 2.1 G/DL (ref 1.5–4.5)
GLUCOSE SERPL-MCNC: 100 MG/DL (ref 65–99)
GLUCOSE UR QL: NEGATIVE
HDLC SERPL-MCNC: 37 MG/DL
HGB UR QL STRIP: NEGATIVE
KETONES UR QL STRIP: NEGATIVE
LDLC SERPL CALC-MCNC: 128 MG/DL (ref 0–99)
LEUKOCYTE ESTERASE UR QL STRIP: NEGATIVE
MICRO URNS: NORMAL
MICRO URNS: NORMAL
MUCOUS THREADS URNS QL MICRO: PRESENT
NITRITE UR QL STRIP: NEGATIVE
PH UR STRIP: 6 [PH] (ref 5–7.5)
POTASSIUM SERPL-SCNC: 4 MMOL/L (ref 3.5–5.2)
PROT SERPL-MCNC: 6.5 G/DL (ref 6–8.5)
PROT UR QL STRIP: NEGATIVE
RBC #/AREA URNS HPF: NORMAL /HPF (ref 0–2)
SL AMB EGFR AFRICAN AMERICAN: 114 ML/MIN/1.73
SL AMB EGFR NON AFRICAN AMERICAN: 99 ML/MIN/1.73
SL AMB URINALYSIS REFLEX: NORMAL
SL AMB VLDL CHOLESTEROL CALC: 36 MG/DL (ref 5–40)
SODIUM SERPL-SCNC: 138 MMOL/L (ref 134–144)
SP GR UR: 1.01 (ref 1–1.03)
TRIGL SERPL-MCNC: 204 MG/DL (ref 0–149)
TSH SERPL DL<=0.005 MIU/L-ACNC: 0.44 UIU/ML (ref 0.45–4.5)
URATE SERPL-MCNC: 6.1 MG/DL (ref 3.8–8.4)
UROBILINOGEN UR STRIP-ACNC: 0.2 MG/DL (ref 0.2–1)
WBC #/AREA URNS HPF: NORMAL /HPF (ref 0–5)

## 2020-12-23 ENCOUNTER — TELEPHONE (OUTPATIENT)
Dept: FAMILY MEDICINE CLINIC | Facility: CLINIC | Age: 30
End: 2020-12-23

## 2020-12-28 ENCOUNTER — TELEPHONE (OUTPATIENT)
Dept: FAMILY MEDICINE CLINIC | Facility: CLINIC | Age: 30
End: 2020-12-28

## 2021-01-04 ENCOUNTER — OFFICE VISIT (OUTPATIENT)
Dept: FAMILY MEDICINE CLINIC | Facility: CLINIC | Age: 31
End: 2021-01-04
Payer: COMMERCIAL

## 2021-01-04 VITALS
HEART RATE: 67 BPM | HEIGHT: 68 IN | TEMPERATURE: 98.4 F | OXYGEN SATURATION: 96 % | BODY MASS INDEX: 30.01 KG/M2 | DIASTOLIC BLOOD PRESSURE: 82 MMHG | SYSTOLIC BLOOD PRESSURE: 118 MMHG | WEIGHT: 198 LBS

## 2021-01-04 DIAGNOSIS — F33.1 MODERATE EPISODE OF RECURRENT MAJOR DEPRESSIVE DISORDER (HCC): ICD-10-CM

## 2021-01-04 DIAGNOSIS — R79.89 LOW TSH LEVEL: ICD-10-CM

## 2021-01-04 DIAGNOSIS — J45.40 MODERATE PERSISTENT ASTHMA WITHOUT COMPLICATION: ICD-10-CM

## 2021-01-04 DIAGNOSIS — E78.2 MIXED HYPERLIPIDEMIA: ICD-10-CM

## 2021-01-04 DIAGNOSIS — Z00.00 ANNUAL PHYSICAL EXAM: Primary | ICD-10-CM

## 2021-01-04 DIAGNOSIS — F90.9 ATTENTION DEFICIT HYPERACTIVITY DISORDER (ADHD), UNSPECIFIED ADHD TYPE: ICD-10-CM

## 2021-01-04 PROCEDURE — 3008F BODY MASS INDEX DOCD: CPT | Performed by: NURSE PRACTITIONER

## 2021-01-04 PROCEDURE — 99395 PREV VISIT EST AGE 18-39: CPT | Performed by: NURSE PRACTITIONER

## 2021-01-04 PROCEDURE — 1036F TOBACCO NON-USER: CPT | Performed by: NURSE PRACTITIONER

## 2021-01-04 NOTE — ASSESSMENT & PLAN NOTE
Feels as though depression symptoms have recently worsened  Denies SI/HI  To continue care with psychiatrist   Ean Cancino to begin counseling  Patient is agreeable to this

## 2021-01-04 NOTE — ASSESSMENT & PLAN NOTE
Lipids elevated  Advised to increase fish oil intake to 4 g daily  Counseled on the importance of beginning a heart healthy diet, decreasing soda intake, increasing dietary fiber, avoiding fried/processed food, and beginning daily physical activity  Will repeat labs in 6 months  Follow-up in 6 months or sooner if needed

## 2021-01-04 NOTE — ASSESSMENT & PLAN NOTE
Stable  To continue albuterol as needed and Asmanex daily  Counseled on the importance of avoiding respiratory irritants

## 2021-01-04 NOTE — PATIENT INSTRUCTIONS
Wellness Visit for Adults   AMBULATORY CARE:   A wellness visit  is when you see your healthcare provider to get screened for health problems  Your healthcare provider will also give you advice on how to stay healthy  Write down your questions so you remember to ask them  Ask your healthcare provider how often you should have a wellness visit  What happens at a wellness visit:  Your healthcare provider will ask about your health, and your family history of health problems  This includes high blood pressure, heart disease, and cancer  He or she will ask if you have symptoms that concern you, if you smoke, and about your mood  You may also be asked about your intake of medicines, supplements, food, and alcohol  Any of the following may be done:  · Your weight  will be checked  Your height may also be checked so your body mass index (BMI) can be calculated  Your BMI shows if you are at a healthy weight  · Your blood pressure  and heart rate will be checked  Your temperature may also be checked  · Blood and urine tests  may be done  Blood tests may be done to check your cholesterol levels  Abnormal cholesterol levels increase your risk for heart disease and stroke  You may also need a blood or urine test to check for diabetes if you are at increased risk  Urine tests may be done to look for signs of an infection or kidney disease  · A physical exam  includes checking your heartbeat and lungs with a stethoscope  Your healthcare provider may also check your skin to look for sun damage  · Screening tests  may be recommended  A screening test is done to check for diseases that may not cause symptoms  The screening tests you may need depend on your age, gender, family history, and lifestyle habits  For example, colorectal screening may be recommended if you are 48years old or older  Screening tests you need if you are a woman:   · A Pap smear  is used to screen for cervical cancer   Pap smears are usually done every 3 to 5 years depending on your age  You may need them more often if you have had abnormal Pap smear test results in the past  Ask your healthcare provider how often you should have a Pap smear  · A mammogram  is an x-ray of your breasts to screen for breast cancer  Experts recommend mammograms every 2 years starting at age 48 years  You may need a mammogram at age 52 years or younger if you have an increased risk for breast cancer  Talk to your healthcare provider about when you should start having mammograms and how often you need them  Vaccines you may need:   · Get an influenza vaccine  every year  The influenza vaccine protects you from the flu  Several types of viruses cause the flu  The viruses change over time, so new vaccines are made each year  · Get a tetanus-diphtheria (Td) booster vaccine  every 10 years  This vaccine protects you against tetanus and diphtheria  Tetanus is a severe infection that may cause painful muscle spasms and lockjaw  Diphtheria is a severe bacterial infection that causes a thick covering in the back of your mouth and throat  · Get a human papillomavirus (HPV) vaccine  if you are female and aged 23 to 32 or male 23 to 24 and never received it  This vaccine protects you from HPV infection  HPV is the most common infection spread by sexual contact  HPV may also cause vaginal, penile, and anal cancers  · Get a pneumococcal vaccine  if you are aged 72 years or older  The pneumococcal vaccine is an injection given to protect you from pneumococcal disease  Pneumococcal disease is an infection caused by pneumococcal bacteria  The infection may cause pneumonia, meningitis, or an ear infection  · Get a shingles vaccine  if you are 60 or older, even if you have had shingles before  The shingles vaccine is an injection to protect you from the varicella-zoster virus  This is the same virus that causes chickenpox   Shingles is a painful rash that develops in people who had chickenpox or have been exposed to the virus  How to eat healthy:  My Plate is a model for planning healthy meals  It shows the types and amounts of foods that should go on your plate  Fruits and vegetables make up about half of your plate, and grains and protein make up the other half  A serving of dairy is included on the side of your plate  The amount of calories and serving sizes you need depends on your age, gender, weight, and height  Examples of healthy foods are listed below:  · Eat a variety of vegetables  such as dark green, red, and orange vegetables  You can also include canned vegetables low in sodium (salt) and frozen vegetables without added butter or sauces  · Eat a variety of fresh fruits , canned fruit in 100% juice, frozen fruit, and dried fruit  · Include whole grains  At least half of the grains you eat should be whole grains  Examples include whole-wheat bread, wheat pasta, brown rice, and whole-grain cereals such as oatmeal     · Eat a variety of protein foods such as seafood (fish and shellfish), lean meat, and poultry without skin (turkey and chicken)  Examples of lean meats include pork leg, shoulder, or tenderloin, and beef round, sirloin, tenderloin, and extra lean ground beef  Other protein foods include eggs and egg substitutes, beans, peas, soy products, nuts, and seeds  · Choose low-fat dairy products such as skim or 1% milk or low-fat yogurt, cheese, and cottage cheese  · Limit unhealthy fats  such as butter, hard margarine, and shortening  Exercise:  Exercise at least 30 minutes per day on most days of the week  Some examples of exercise include walking, biking, dancing, and swimming  You can also fit in more physical activity by taking the stairs instead of the elevator or parking farther away from stores  Include muscle strengthening activities 2 days each week  Regular exercise provides many health benefits   It helps you manage your weight, and decreases your risk for type 2 diabetes, heart disease, stroke, and high blood pressure  Exercise can also help improve your mood  Ask your healthcare provider about the best exercise plan for you  General health and safety guidelines:   · Do not smoke  Nicotine and other chemicals in cigarettes and cigars can cause lung damage  Ask your healthcare provider for information if you currently smoke and need help to quit  E-cigarettes or smokeless tobacco still contain nicotine  Talk to your healthcare provider before you use these products  · Limit alcohol  A drink of alcohol is 12 ounces of beer, 5 ounces of wine, or 1½ ounces of liquor  · Lose weight, if needed  Being overweight increases your risk of certain health conditions  These include heart disease, high blood pressure, type 2 diabetes, and certain types of cancer  · Protect your skin  Do not sunbathe or use tanning beds  Use sunscreen with a SPF 15 or higher  Apply sunscreen at least 15 minutes before you go outside  Reapply sunscreen every 2 hours  Wear protective clothing, hats, and sunglasses when you are outside  · Drive safely  Always wear your seatbelt  Make sure everyone in your car wears a seatbelt  A seatbelt can save your life if you are in an accident  Do not use your cell phone when you are driving  This could distract you and cause an accident  Pull over if you need to make a call or send a text message  · Practice safe sex  Use latex condoms if are sexually active and have more than one partner  Your healthcare provider may recommend screening tests for sexually transmitted infections (STIs)  · Wear helmets, lifejackets, and protective gear  Always wear a helmet when you ride a bike or motorcycle, go skiing, or play sports that could cause a head injury  Wear protective equipment when you play sports  Wear a lifejacket when you are on a boat or doing water sports      © Copyright [employer] 2020 Information is for End User's use only and may not be sold, redistributed or otherwise used for commercial purposes  All illustrations and images included in CareNotes® are the copyrighted property of [employer] M [employer]  or Tosin Weaver  The above information is an  only  It is not intended as medical advice for individual conditions or treatments  Talk to your doctor, nurse or pharmacist before following any medical regimen to see if it is safe and effective for you  Weight Management   AMBULATORY CARE:   Why it is important to manage your weight:  Being overweight increases your risk of health conditions such as heart disease, high blood pressure, type 2 diabetes, and certain types of cancer  It can also increase your risk for osteoarthritis, sleep apnea, and other respiratory problems  Aim for a slow, steady weight loss  Even a small amount of weight loss can lower your risk of health problems  How to lose weight safely:  A safe and healthy way to lose weight is to eat fewer calories and get regular exercise  · You can lose up about 1 pound a week by decreasing the number of calories you eat by 500 calories each day  You can decrease calories by eating smaller portion sizes or by cutting out high-calorie foods  Read labels to find out how many calories are in the foods you eat  · You can also burn calories with exercise such as walking, swimming, or biking  You will be more likely to keep weight off if you make these changes part of your lifestyle  Exercise at least 30 minutes per day on most days of the week  You can also fit in more physical activity by taking the stairs instead of the elevator or parking farther away from stores  Ask your healthcare provider about the best exercise plan for you  Healthy meal plan for weight management:  A healthy meal plan includes a variety of foods, contains fewer calories, and helps you stay healthy   A healthy meal plan includes the following:     · Eat whole-grain foods more often  A healthy meal plan should contain fiber  Fiber is the part of grains, fruits, and vegetables that is not broken down by your body  Whole-grain foods are healthy and provide extra fiber in your diet  Some examples of whole-grain foods are whole-wheat breads and pastas, oatmeal, brown rice, and bulgur  · Eat a variety of vegetables every day  Include dark, leafy greens such as spinach, kale, cristiano greens, and mustard greens  Eat yellow and orange vegetables such as carrots, sweet potatoes, and winter squash  · Eat a variety of fruits every day  Choose fresh or canned fruit (canned in its own juice or light syrup) instead of juice  Fruit juice has very little or no fiber  · Eat low-fat dairy foods  Drink fat-free (skim) milk or 1% milk  Eat fat-free yogurt and low-fat cottage cheese  Try low-fat cheeses such as mozzarella and other reduced-fat cheeses  · Choose meat and other protein foods that are low in fat  Choose beans or other legumes such as split peas or lentils  Choose fish, skinless poultry (chicken or turkey), or lean cuts of red meat (beef or pork)  Before you cook meat or poultry, cut off any visible fat  · Use less fat and oil  Try baking foods instead of frying them  Add less fat, such as margarine, sour cream, regular salad dressing and mayonnaise to foods  Eat fewer high-fat foods  Some examples of high-fat foods include french fries, doughnuts, ice cream, and cakes  · Eat fewer sweets  Limit foods and drinks that are high in sugar  This includes candy, cookies, regular soda, and sweetened drinks  Ways to decrease calories:   · Eat smaller portions  ? Use a small plate with smaller servings  ? Do not eat second helpings  ? When you eat at a restaurant, ask for a box and place half of your meal in the box before you eat  ? Share an entrée with someone else  · Replace high-calorie snacks with healthy, low-calorie snacks  ? Choose fresh fruit, vegetables, fat-free rice cakes, or air-popped popcorn instead of potato chips, nuts, or chocolate  ? Choose water or calorie-free drinks instead of soda or sweetened drinks  · Do not shop for groceries when you are hungry  You may be more likely to make unhealthy food choices  Take a grocery list of healthy foods and shop after you have eaten  · Eat regular meals  Do not skip meals  Skipping meals can lead to overeating later in the day  This can make it harder for you to lose weight  Eat a healthy snack in place of a meal if you do not have time to eat a regular meal  Talk with a dietitian to help you create a meal plan and schedule that is right for you  Other things to consider as you try to lose weight:   · Be aware of situations that may give you the urge to overeat, such as eating while watching television  Find ways to avoid these situations  For example, read a book, go for a walk, or do crafts  · Meet with a weight loss support group or friends who are also trying to lose weight  This may help you stay motivated to continue working on your weight loss goals  © Copyright [address] Information is for End User's use only and may not be sold, redistributed or otherwise used for commercial purposes  All illustrations and images included in CareNotes® are the copyrighted property of [employer]  or [facility] Josue Hooks   The above information is an  only  It is not intended as medical advice for individual conditions or treatments  Talk to your doctor, nurse or pharmacist before following any medical regimen to see if it is safe and effective for you  Heart Healthy Diet   AMBULATORY CARE:   A heart healthy diet  is an eating plan low in unhealthy fats and sodium (salt)  The plan is high in healthy fats and fiber  A heart healthy diet helps improve your cholesterol levels and lowers your risk for heart disease and stroke   A dietitian will teach you how to read and understand food labels  Heart healthy diet guidelines to follow:   · Choose foods that contain healthy fats  ? Unsaturated fats  include monounsaturated and polyunsaturated fats  Unsaturated fat is found in foods such as soybean, canola, olive, corn, and safflower oils  It is also found in soft tub margarine that is made with liquid vegetable oil  ? Omega-3 fat  is found in certain fish, such as salmon, tuna, and trout, and in walnuts and flaxseed  Eat fish high in omega-3 fats at least 2 times a week  · Get 20 to 30 grams of fiber each day  Fruits, vegetables, whole-grain foods, and legumes (cooked beans) are good sources of fiber  · Limit or do not have unhealthy fats  ? Cholesterol  is found in animal foods, such as eggs and lobster, and in dairy products made from whole milk  Limit cholesterol to less than 200 mg each day  ? Saturated fat  is found in meats, such as byrnes and hamburger  It is also found in chicken or turkey skin, whole milk, and butter  Limit saturated fat to less than 7% of your total daily calories  ? Trans fat  is found in packaged foods, such as potato chips and cookies  It is also in hard margarine, some fried foods, and shortening  Do not eat foods that contain trans fats  · Limit sodium as directed  You may be told to limit sodium to 2,000 to 2,300 mg each day  Choose low-sodium or no-salt-added foods  Add little or no salt to food you prepare  Use herbs and spices in place of salt  Include the following in your heart healthy plan:  Ask your dietitian or healthcare provider how many servings to have from each of the following food groups:  · Grains:      ? Whole-wheat breads, cereals, and pastas, and brown rice    ? Low-fat, low-sodium crackers and chips    · Vegetables:      ? Broccoli, green beans, green peas, and spinach    ? Collards, kale, and lima beans    ?  Carrots, sweet potatoes, tomatoes, and peppers    ? Canned vegetables with no salt added    · Fruits:      ? Bananas, peaches, pears, and pineapple    ? Grapes, raisins, and dates    ? Oranges, tangerines, grapefruit, orange juice, and grapefruit juice    ? Apricots, mangoes, melons, and papaya    ? Raspberries and strawberries    ? Canned fruit with no added sugar    · Low-fat dairy:      ? Nonfat (skim) milk, 1% milk, and low-fat almond, cashew, or soy milks fortified with calcium    ? Low-fat cheese, regular or frozen yogurt, and cottage cheese    · Meats and proteins:      ? Lean cuts of beef and pork (loin, leg, round), skinless chicken and turkey    ? Legumes, soy products, egg whites, or nuts    Limit or do not include the following in your heart healthy plan:   · Unhealthy fats and oils:      ? Whole or 2% milk, cream cheese, sour cream, or cheese    ? High-fat cuts of beef (T-bone steaks, ribs), chicken or turkey with skin, and organ meats such as liver    ? Butter, stick margarine, shortening, and cooking oils such as coconut or palm oil    · Foods and liquids high in sodium:      ? Packaged foods, such as frozen dinners, cookies, macaroni and cheese, and cereals with more than 300 mg of sodium per serving    ? Vegetables with added sodium, such as instant potatoes, vegetables with added sauces, or regular canned vegetables    ? Cured or smoked meats, such as hot dogs, byrnes, and sausage    ? High-sodium ketchup, barbecue sauce, salad dressing, pickles, olives, soy sauce, or miso    · Foods and liquids high in sugar:      ? Candy, cake, cookies, pies, or doughnuts    ? Soft drinks (soda), sports drinks, or sweetened tea    ? Canned or dry mixes for cakes, soups, sauces, or gravies    Other healthy heart guidelines:   · Do not smoke  Nicotine and other chemicals in cigarettes and cigars can cause lung and heart damage  Ask your healthcare provider for information if you currently smoke and need help to quit   E-cigarettes or smokeless tobacco still contain nicotine  Talk to your healthcare provider before you use these products  · Limit or do not drink alcohol as directed  Alcohol can damage your heart and raise your blood pressure  Your healthcare provider may give you specific daily and weekly limits  The general recommended limit is 1 drink a day for women 21 or older and for men 72 or older  Do not have more than 3 drinks in a day or 7 in a week  The recommended limit is 2 drinks a day for men 24to 59years of age  Do not have more than 4 drinks in a day or 14 in a week  A drink of alcohol is 12 ounces of beer, 5 ounces of wine, or 1½ ounces of liquor  · Exercise regularly  Exercise can help you maintain a healthy weight and improve your blood pressure and cholesterol levels  Regular exercise can also decrease your risk for heart problems  Ask your healthcare provider about the best exercise plan for you  Do not start an exercise program without asking your healthcare provider  Follow up with your doctor or cardiologist as directed:  Write down your questions so you remember to ask them during your visits  © Copyright [address] Information is for End User's use only and may not be sold, redistributed or otherwise used for commercial purposes  All illustrations and images included in CareNotes® are the copyrighted property of [employer] A M , Inc  or [address] [place] [address]  The above information is an  only  It is not intended as medical advice for individual conditions or treatments  Talk to your doctor, nurse or pharmacist before following any medical regimen to see if it is safe and effective for you  Calorie Counting Diet   WHAT YOU NEED TO KNOW:   What is a calorie counting diet? It is a meal plan based on counting calories each day to reach a healthy body weight  You will need to eat fewer calories if you are trying to lose weight   Weight loss may decrease your risk for certain health problems or improve your health if you have health problems  Some of these health problems include heart disease, high blood pressure, and diabetes  What foods should I avoid? Your dietitian will tell you if you need to avoid certain foods based on your body weight and health condition  You may need to avoid high-fat foods if you are at risk for or have heart disease  You may need to eat fewer foods from the breads and starches food group if you have diabetes  How many calories are in foods? The following is a list of foods and drinks with the approximate number of calories in each  Check the food label to find the exact number of calories  A dietitian can tell you how many calories you should have from each food group each day  · Carbohydrate:      ? ½ of a 3-inch bagel, 1 slice of bread, or ½ of a hamburger bun or hot dog bun (80)    ? 1 (8-inch) flour tortilla or ½ cup of cooked rice (100)    ? 1 (6-inch) corn tortilla (80)    ? 1 (6-inch) pancake or 1 cup of bran flakes cereal (110)    ? ½ cup of cooked cereal (80)    ? ½ cup of cooked pasta (85)    ? 1 ounce of pretzels (100)    ? 3 cups of air-popped popcorn without butter or oil (80)    · Dairy:      ? 1 cup of skim or 1% milk (90)    ? 1 cup of 2% milk (120)    ? 1 cup of whole milk (160)    ? 1 cup of 2% chocolate milk (220)    ? 1 ounce of low-fat cheese with 3 grams of fat per ounce (70)    ? 1 ounce of cheddar cheese (114)    ? ½ cup of 1% fat cottage cheese (80)    ? 1 cup of plain or sugar-free, fat-free yogurt (90)    · Protein foods:      ? 3 ounces of fish (not breaded or fried) (95)    ? 3 ounces of breaded, fried fish (195)    ? ¾ cup of tuna canned in water (105)    ? 3 ounces of chicken breast without skin (105)    ? 1 fried chicken breast with skin (350)    ? ¼ cup of fat free egg substitute (40)    ? 1 large egg (75)    ? 3 ounces of lean beef or pork (165)    ? 3 ounces of fried pork chop or ham (185)    ?  ½ cup of cooked dried beans, such as kidney, luna, lentils, or navy (115)    ? 3 ounces of bologna or lunch meat (225)    ? 2 links of breakfast sausage (140)    · Vegetables:      ? ½ cup of sliced mushrooms (10)    ? 1 cup of salad greens, such as lettuce, spinach, or felipa (15)    ? ½ cup of steamed asparagus (20)    ? ½ cup of cooked summer squash, zucchini squash, or green or wax beans (25)    ? 1 cup of broccoli or cauliflower florets, or 1 medium tomato (25)    ? 1 large raw carrot or ½ cup of cooked carrots (40)    ? ? of a medium cucumber or 1 stalk of celery (5)    ? 1 small baked potato (160)    ? 1 cup of breaded, fried vegetables (230)    · Fruit:      ? 1 (6-inch) banana (55)     ? ½ of a 4-inch grapefruit (55)    ? 15 grapes (60)    ? 1 medium orange or apple (70)    ? 1 large peach (65)    ? 1 cup of fresh pineapple chunks (75)    ? 1 cup of melon cubes (50)    ? 1¼ cups of whole strawberries (45)    ? ½ cup of fruit canned in juice (55)    ? ½ cup of fruit canned in heavy syrup (110)    ? ? cup of raisins (130)    ? ½ cup of unsweetened fruit juice (60)    ? ½ cup of grape, cranberry, or prune juice (90)    · Fat:      ? 10 peanuts or 2 teaspoons of peanut butter (55)    ? 2 tablespoons of avocado or 1 tablespoon of regular salad dressing (45)    ? 2 slices of byrnes (90)    ? 1 teaspoon of oil, such as safflower, canola, corn, or olive oil (45)    ? 2 teaspoons of low-fat margarine, or 1 tablespoon of low-fat mayonnaise (50)    ? 1 teaspoon of regular margarine (40)    ? 1 tablespoon of regular mayonnaise (135)    ? 1 tablespoon of cream cheese or 2 tablespoons of low-fat cream cheese (45)    ? 2 tablespoons of vegetable shortening (215)    · Dessert and sweets:      ? 8 animal crackers or 5 vanilla wafers (80)    ? 1 frozen fruit juice bar (80)    ? ½ cup of ice milk or low-fat frozen yogurt (90)    ? ½ cup of sherbet or sorbet (125)    ? ½ cup of sugar-free pudding or custard (60)    ? ½ cup of ice cream (140)    ?  ½ cup of pudding or custard (175)    ? 1 (2-inch) square chocolate brownie (185)    · Combination foods:      ? Bean burrito made with an 8-inch tortilla, without cheese (275)    ? Chicken breast sandwich with lettuce and tomato (325)    ? 1 cup of chicken noodle soup (60)    ? 1 beef taco (175)    ? Regular hamburger with lettuce and tomato (310)    ? Regular cheeseburger with lettuce and tomato (410)     ? ¼ of a 12-inch cheese pizza (280)    ? Fried fish sandwich with lettuce and tomato (425)    ? Hot dog and bun (275)    ? 1½ cups of macaroni and cheese (310)    ? Taco salad with a fried tortilla shell (870)    · Low-calorie foods:      ? 1 tablespoon of ketchup or 1 tablespoon of fat free sour cream (15)    ? 1 teaspoon of mustard (5)    ? ¼ cup of salsa (20)    ? 1 large dill pickle (15)    ? 1 tablespoon of fat free salad dressing (10)    ? 2 teaspoons of low-sugar, light jam or jelly, or 1 tablespoon of sugar-free syrup (15)    ? 1 sugar-free popsicle (15)    ? 1 cup of club soda, seltzer water, or diet soda (0)    CARE AGREEMENT:   You have the right to help plan your care  Discuss treatment options with your healthcare provider to decide what care you want to receive  You always have the right to refuse treatment  The above information is an  only  It is not intended as medical advice for individual conditions or treatments  Talk to your doctor, nurse or pharmacist before following any medical regimen to see if it is safe and effective for you  © Copyright [address] Information is for End User's use only and may not be sold, redistributed or otherwise used for commercial purposes  All illustrations and images included in CareNotes® are the copyrighted property of A D A M , Inc  or Tosin Weaver      Continue to decrease soda intake, goal 1-2 cans per week at most!  Begin daily physical activity working up to 30 minutes 5 times a week    Increase healthy food choices, dietary fiber, fresh fruit and veggies  Avoid processed foods or fast food  We will repeat you labs in 6 months  You TSH was very slightly abnormal  We will repeat this in 2 weeks and call with the results

## 2021-01-04 NOTE — PROGRESS NOTES
[facility] [address]    NAME: Mariam Dawson  AGE: 31 y o  SEX: male  : 1990     DATE: 2021     Assessment and Plan:     Problem List Items Addressed This Visit        Respiratory    Asthma     Stable  To continue albuterol as needed and Asmanex daily  Counseled on the importance of avoiding respiratory irritants  Other    ADHD (attention deficit hyperactivity disorder)     Stable  To continue Vyvanse 30 mg daily which is managed by Psychiatry  Hyperlipemia     Lipids elevated  Advised to increase fish oil intake to 4 g daily  Counseled on the importance of beginning a heart healthy diet, decreasing soda intake, increasing dietary fiber, avoiding fried/processed food, and beginning daily physical activity  Will repeat labs in 6 months  Follow-up in 6 months or sooner if needed  Relevant Orders    Comprehensive metabolic panel    Lipid Panel with Direct LDL reflex    Depression     Feels as though depression symptoms have recently worsened  Denies SI/HI  To continue care with psychiatrist   Jj Gutiérrez to begin counseling  Patient is agreeable to this  Annual physical exam - Primary     Advised to make an appointment for dental cleaning an eye exam              Other Visit Diagnoses     Low TSH level        To repeat TSH in 2 weeks, will call with results  Relevant Orders    TSH, 3rd generation with Free T4 reflex          Immunizations and preventive care screenings were discussed with patient today  Appropriate education was printed on patient's after visit summary  Counseling:  Alcohol/drug use: discussed moderation in alcohol intake, the recommendations for healthy alcohol use, and avoidance of illicit drug use  Dental Health: discussed importance of regular tooth brushing, flossing, and dental visits    Injury prevention: discussed safety/seat belts, safety helmets, smoke detectors, carbon dioxide detectors, and smoking near bedding or upholstery  Sexual health: discussed sexually transmitted diseases, partner selection, use of condoms, avoidance of unintended pregnancy, and contraceptive alternatives  · Exercise: the importance of regular exercise/physical activity was discussed  Recommend exercise 3-5 times per week for at least 30 minutes  Return in about 6 months (around 7/4/2021), or if symptoms worsen or fail to improve  Chief Complaint:     Chief Complaint   Patient presents with    Physical Exam      History of Present Illness:     Adult Annual Physical   Patient here for a comprehensive physical exam  The patient reports no problems  Would like to review recent labs  Flank pain greatly improved with reduction of tea and soda  Still drinking 1 can of soda per day  Monica Teague is connected with a psychiatrist   He sees Gilbert Bellamy who manages his depression and ADHD  Feels as though his depression symptoms have worsened recently  Denies SI/HI  Diet and Physical Activity  · Diet/Nutrition: poor diet and limited fruits/vegetables  · Exercise: no formal exercise  Depression Screening  PHQ-9 Depression Screening    PHQ-9:   Frequency of the following problems over the past two weeks:           General Health  · Sleep: gets 7-8 hours of sleep on average  · Hearing: normal - bilateral   · Vision: most recent eye exam >1 year ago and wears glasses  · Dental: no dental visits for >1 year   Health  · History of STDs?: no      Review of Systems:     Review of Systems   Constitutional: Negative  HENT: Negative  Eyes: Negative  Respiratory: Negative  Cardiovascular: Negative  Gastrointestinal: Negative  Endocrine: Negative  Genitourinary: Negative  Musculoskeletal: Negative  Skin: Negative  Allergic/Immunologic: Negative  Neurological: Negative  Hematological: Negative      Psychiatric/Behavioral: Positive for dysphoric mood  Past Medical History:     History reviewed  No pertinent past medical history  Past Surgical History:     History reviewed  No pertinent surgical history  Social History:        Social History     Socioeconomic History    Marital status: Single     Spouse name: None    Number of children: None    Years of education: None    Highest education level: None   Occupational History    None   Social Needs    Financial resource strain: None    Food insecurity     Worry: None     Inability: None    Transportation needs     Medical: None     Non-medical: None   Tobacco Use    Smoking status: Never Smoker    Smokeless tobacco: Never Used   Substance and Sexual Activity    Alcohol use: None    Drug use: None    Sexual activity: None   Lifestyle    Physical activity     Days per week: None     Minutes per session: None    Stress: None   Relationships    Social connections     Talks on phone: None     Gets together: None     Attends Yazdanism service: None     Active member of club or organization: None     Attends meetings of clubs or organizations: None     Relationship status: None    Intimate partner violence     Fear of current or ex partner: None     Emotionally abused: None     Physically abused: None     Forced sexual activity: None   Other Topics Concern    None   Social History Narrative    None      Family History:     History reviewed  No pertinent family history     Current Medications:     Current Outpatient Medications   Medication Sig Dispense Refill    albuterol (2 5 mg/3 mL) 0 083 % nebulizer solution Take 1 vial (2 5 mg total) by nebulization every 6 (six) hours as needed for wheezing or shortness of breath 25 vial 3    albuterol (ProAir HFA) 90 mcg/act inhaler Inhale 2 puffs every 6 (six) hours as needed for wheezing 1 Inhaler 1    Brexpiprazole (Rexulti) 1 MG tablet Take 1 mg by mouth daily      cholecalciferol (VITAMIN D3) 25 mcg (1,000 units) tablet Take by mouth      desvenlafaxine (PRISTIQ) 100 mg 24 hr tablet Take 100 mg by mouth daily      lisdexamfetamine (VYVANSE) 30 MG capsule Take 30 mg by mouth every morning      loratadine (CLARITIN) 10 mg tablet Take 1 tablet by mouth daily      mometasone (Asmanex, 30 Metered Doses,) 220 MCG/INH inhaler Inhale daily      Omega-3 Fatty Acids (Fish Oil) 645 MG CAPS Take by mouth       No current facility-administered medications for this visit  Allergies: Allergies   Allergen Reactions    Cefprozil Hives    Sulfa Antibiotics       Physical Exam:     /82 (BP Location: Left arm, Patient Position: Sitting)   Pulse 67   Temp 98 4 °F (36 9 °C)   Ht 5' 8" (1 727 m)   Wt 89 8 kg (198 lb)   SpO2 96%   BMI 30 11 kg/m²     Physical Exam  Vitals signs and nursing note reviewed  Constitutional:       General: He is not in acute distress  Appearance: Normal appearance  He is well-developed  He is not ill-appearing, toxic-appearing or diaphoretic  HENT:      Head: Normocephalic and atraumatic  Right Ear: Tympanic membrane and external ear normal       Left Ear: Tympanic membrane and external ear normal       Nose: Nose normal       Mouth/Throat:      Mouth: Mucous membranes are moist       Pharynx: Oropharynx is clear  No oropharyngeal exudate  Eyes:      General: Lids are normal       Conjunctiva/sclera: Conjunctivae normal       Pupils: Pupils are equal, round, and reactive to light  Neck:      Musculoskeletal: Normal range of motion and neck supple  Cardiovascular:      Rate and Rhythm: Normal rate and regular rhythm  Heart sounds: No murmur  Pulmonary:      Effort: Pulmonary effort is normal  No respiratory distress  Breath sounds: Normal breath sounds  No stridor  No wheezing or rales  Chest:      Chest wall: No tenderness  Abdominal:      General: Bowel sounds are normal  There is no distension  Palpations: Abdomen is soft  There is no mass  Tenderness: There is no abdominal tenderness  There is no guarding or rebound  Hernia: No hernia is present  Musculoskeletal: Normal range of motion  General: No deformity  Lymphadenopathy:      Cervical: No cervical adenopathy  Skin:     General: Skin is warm and dry  Capillary Refill: Capillary refill takes less than 2 seconds  Neurological:      General: No focal deficit present  Mental Status: He is alert and oriented to person, place, and time  Psychiatric:         Attention and Perception: Attention normal          Mood and Affect: Affect is flat  Speech: Speech normal          Behavior: Behavior normal          Thought Content:  Thought content normal          Cognition and Memory: Cognition normal          Judgment: Judgment normal           Results for orders placed or performed in visit on 12/19/20   Comprehensive metabolic panel   Result Value Ref Range    Glucose, Random 100 (H) 65 - 99 mg/dL    BUN 13 6 - 20 mg/dL    Creatinine 1 01 0 76 - 1 27 mg/dL    eGFR Non African American 99 >59 mL/min/1 73    eGFR  114 >59 mL/min/1 73    SL AMB BUN/CREATININE RATIO 13 9 - 20    Sodium 138 134 - 144 mmol/L    Potassium 4 0 3 5 - 5 2 mmol/L    Chloride 101 96 - 106 mmol/L    CO2 25 20 - 29 mmol/L    CALCIUM 9 1 8 7 - 10 2 mg/dL    Protein, Total 6 5 6 0 - 8 5 g/dL    Albumin 4 4 4 0 - 5 0 g/dL    Globulin, Total 2 1 1 5 - 4 5 g/dL    Albumin/Globulin Ratio 2 1 1 2 - 2 2    TOTAL BILIRUBIN 0 6 0 0 - 1 2 mg/dL    Alk Phos Isoenzymes 65 39 - 117 IU/L    AST 22 0 - 40 IU/L    ALT 30 0 - 44 IU/L   UA/M w/rflx Culture, Routine   Result Value Ref Range    Specific Gravity 1 015 1 005 - 1 030    Ph 6 0 5 0 - 7 5    Color UA Yellow Yellow    Urine Appearance Clear Clear    Leukocyte Esterase Negative Negative    Protein Negative Negative/Trace    Glucose, 24 HR Urine Negative Negative    Ketone, Urine Negative Negative    Blood, Urine Negative Negative    Bilirubin, Urine Negative Negative    Urobilinogen Urine 0 2 0 2 - 1 0 mg/dL    SL AMB NITRITES URINE, QUAL  Negative Negative    Microscopic Examination Comment     Microscopic Examination See below:     Urinalysis Reflex Comment    Microscopic Examination   Result Value Ref Range    SL AMB WBC, URINE 0-5 0 - 5 /hpf    RBC, Urine 0-2 0 - 2 /hpf    Epithelial Cells (non renal) None seen 0 - 10 /hpf    MUCUS THREADS Present Not Estab  Bacteria, Urine None seen None seen/Few   Lipid panel   Result Value Ref Range    Cholesterol, Total 201 (H) 100 - 199 mg/dL    Triglycerides 204 (H) 0 - 149 mg/dL    HDL 37 (L) >39 mg/dL    VLDL Cholesterol Calculated 36 5 - 40 mg/dL    LDL Calculated 128 (H) 0 - 99 mg/dL   TSH, 3rd generation   Result Value Ref Range    TSH 0 439 (L) 0 450 - 4 500 uIU/mL   Uric acid   Result Value Ref Range    Uric Acid 6 1 3 8 - 8 4 mg/dL       ANITA Fajardo   [facility] [address] [facility]    BMI Counseling: Body mass index is 30 11 kg/m²  The BMI is above normal  Nutrition recommendations include reducing portion sizes, decreasing overall calorie intake, 3-5 servings of fruits/vegetables daily, reducing fast food intake, consuming healthier snacks, decreasing soda and/or juice intake, moderation in carbohydrate intake and increasing intake of lean protein  Exercise recommendations include exercising 3-5 times per week

## 2021-01-22 LAB
ALBUMIN SERPL-MCNC: 4.9 G/DL (ref 4–5)
ALBUMIN/GLOB SERPL: 2.2 {RATIO} (ref 1.2–2.2)
ALP SERPL-CCNC: 67 IU/L (ref 39–117)
ALT SERPL-CCNC: 22 IU/L (ref 0–44)
AST SERPL-CCNC: 18 IU/L (ref 0–40)
BILIRUB SERPL-MCNC: 0.8 MG/DL (ref 0–1.2)
BUN SERPL-MCNC: 11 MG/DL (ref 6–20)
BUN/CREAT SERPL: 12 (ref 9–20)
CALCIUM SERPL-MCNC: 9.6 MG/DL (ref 8.7–10.2)
CHLORIDE SERPL-SCNC: 99 MMOL/L (ref 96–106)
CHOLEST SERPL-MCNC: 181 MG/DL (ref 100–199)
CO2 SERPL-SCNC: 26 MMOL/L (ref 20–29)
CREAT SERPL-MCNC: 0.93 MG/DL (ref 0.76–1.27)
GLOBULIN SER-MCNC: 2.2 G/DL (ref 1.5–4.5)
GLUCOSE SERPL-MCNC: 95 MG/DL (ref 65–99)
HDLC SERPL-MCNC: 36 MG/DL
LDLC SERPL CALC-MCNC: 121 MG/DL (ref 0–99)
POTASSIUM SERPL-SCNC: 3.8 MMOL/L (ref 3.5–5.2)
PROT SERPL-MCNC: 7.1 G/DL (ref 6–8.5)
SL AMB EGFR AFRICAN AMERICAN: 126 ML/MIN/1.73
SL AMB EGFR NON AFRICAN AMERICAN: 109 ML/MIN/1.73
SL AMB VLDL CHOLESTEROL CALC: 24 MG/DL (ref 5–40)
SODIUM SERPL-SCNC: 136 MMOL/L (ref 134–144)
TRIGL SERPL-MCNC: 130 MG/DL (ref 0–149)
TSH SERPL DL<=0.005 MIU/L-ACNC: 0.76 UIU/ML (ref 0.45–4.5)

## 2021-02-08 ENCOUNTER — TELEPHONE (OUTPATIENT)
Dept: FAMILY MEDICINE CLINIC | Facility: CLINIC | Age: 31
End: 2021-02-08

## 2021-02-08 NOTE — TELEPHONE ENCOUNTER
----- Message from [address] [employer] sent at 1/25/2021  7:59 AM EST -----  Please call patient to make aware that his labs were normal   His cholesterol has improved but his HDL remains on the lower side  Please encourage heart healthy diet and daily physical activity  We will continue to monitor his blood work

## 2021-02-12 ENCOUNTER — TELEPHONE (OUTPATIENT)
Dept: FAMILY MEDICINE CLINIC | Facility: CLINIC | Age: 31
End: 2021-02-12

## 2021-02-12 NOTE — TELEPHONE ENCOUNTER
----- Message from [address] [employer] sent at 1/25/2021  7:59 AM EST -----  Please call patient to make aware that his labs were normal   His cholesterol has improved but his HDL remains on the lower side  Please encourage heart healthy diet and daily physical activity  We will continue to monitor his blood work

## 2021-07-19 ENCOUNTER — OFFICE VISIT (OUTPATIENT)
Dept: FAMILY MEDICINE CLINIC | Facility: CLINIC | Age: 31
End: 2021-07-19
Payer: COMMERCIAL

## 2021-07-19 VITALS
OXYGEN SATURATION: 99 % | DIASTOLIC BLOOD PRESSURE: 90 MMHG | RESPIRATION RATE: 16 BRPM | HEIGHT: 68 IN | WEIGHT: 203.4 LBS | SYSTOLIC BLOOD PRESSURE: 110 MMHG | HEART RATE: 76 BPM | TEMPERATURE: 98.9 F | BODY MASS INDEX: 30.83 KG/M2

## 2021-07-19 DIAGNOSIS — F90.9 ATTENTION DEFICIT HYPERACTIVITY DISORDER (ADHD), UNSPECIFIED ADHD TYPE: ICD-10-CM

## 2021-07-19 DIAGNOSIS — E78.2 MIXED HYPERLIPIDEMIA: ICD-10-CM

## 2021-07-19 DIAGNOSIS — F33.1 MODERATE EPISODE OF RECURRENT MAJOR DEPRESSIVE DISORDER (HCC): ICD-10-CM

## 2021-07-19 DIAGNOSIS — J45.40 MODERATE PERSISTENT ASTHMA WITHOUT COMPLICATION: Primary | ICD-10-CM

## 2021-07-19 PROCEDURE — 3008F BODY MASS INDEX DOCD: CPT | Performed by: FAMILY MEDICINE

## 2021-07-19 PROCEDURE — 99214 OFFICE O/P EST MOD 30 MIN: CPT | Performed by: FAMILY MEDICINE

## 2021-07-19 PROCEDURE — 3725F SCREEN DEPRESSION PERFORMED: CPT | Performed by: FAMILY MEDICINE

## 2021-07-19 PROCEDURE — 1036F TOBACCO NON-USER: CPT | Performed by: FAMILY MEDICINE

## 2021-07-19 NOTE — PROGRESS NOTES
BMI Counseling: Body mass index is 30 93 kg/m²  The BMI is above normal  Nutrition recommendations include decreasing portion sizes, decreasing fast food intake, limiting drinks that contain sugar, moderation in carbohydrate intake and reducing intake of saturated and trans fat  Exercise recommendations include moderate physical activity 150 minutes/week and vigorous physical activity 75 minutes/week  No pharmacotherapy was ordered  Assessment/Plan:     Chronic Problems:  No problem-specific Assessment & Plan notes found for this encounter  Visit Diagnosis:  Diagnoses and all orders for this visit:    Moderate persistent asthma without complication  Comments:   stable, reviewed step plan of care, continued present medicines    Mixed hyperlipidemia  Comments:   encourage diet modifications    Moderate episode of recurrent major depressive disorder ([facility] [address] )  Comments:   stable continue care/medications with Psychiatry    Attention deficit hyperactivity disorder (ADHD), unspecified ADHD type  Comments:   stable, continue care/medications with Psychiatry          Subjective:    Patient ID: Nelda Canseco is a 31 y o  male  Asthma stable , neg exacerbations ,   asmanex daily , mask wearing has been very beneficial   depression / add, meds unchanged , taken daily , md linares   Chol , has made dietary changes , no longer with the carbonated sodas   Weight , exercise no regular regimen         The following portions of the patient's history were reviewed and updated as appropriate: allergies, current medications, past family history, past medical history, past social history, past surgical history and problem list     Review of Systems   Constitutional: Negative for appetite change, chills, fever and unexpected weight change  HENT: Negative for congestion, dental problem, ear pain, hearing loss, postnasal drip, rhinorrhea, sinus pressure, sinus pain, sneezing, sore throat, tinnitus and voice change  Eyes: Negative for visual disturbance  Respiratory: Negative for apnea, cough, chest tightness and shortness of breath  Cardiovascular: Negative for chest pain, palpitations and leg swelling  Gastrointestinal: Negative for abdominal pain, blood in stool, constipation, diarrhea, nausea and vomiting  Endocrine: Negative for cold intolerance, heat intolerance, polydipsia, polyphagia and polyuria  Genitourinary: Negative for decreased urine volume, difficulty urinating, dysuria, frequency and hematuria  Musculoskeletal: Negative for arthralgias, back pain, gait problem, joint swelling and myalgias  Skin: Negative for color change, rash and wound  Allergic/Immunologic: Negative for environmental allergies and food allergies  Neurological: Negative for dizziness, syncope, weakness, light-headedness, numbness and headaches  Hematological: Negative for adenopathy  Does not bruise/bleed easily  Psychiatric/Behavioral: Negative for sleep disturbance and suicidal ideas  The patient is not nervous/anxious            /90   Pulse 76   Temp 98 9 °F (37 2 °C)   Resp 16   Ht 5' 8" (1 727 m)   Wt 92 3 kg (203 lb 6 4 oz)   SpO2 99%   BMI 30 93 kg/m²   Social History     Socioeconomic History    Marital status: Single     Spouse name: Not on file    Number of children: Not on file    Years of education: Not on file    Highest education level: Not on file   Occupational History    Not on file   Tobacco Use    Smoking status: Never Smoker    Smokeless tobacco: Never Used   Substance and Sexual Activity    Alcohol use: Not on file    Drug use: Not on file    Sexual activity: Not on file   Other Topics Concern    Not on file   Social History Narrative    Not on file     Social Determinants of Health     Financial Resource Strain:     Difficulty of Paying Living Expenses:    Food Insecurity:     Worried About Running Out of Food in the Last Year:     [address] in the Last Year: Transportation Needs:     Lack of Transportation (Medical):  Lack of Transportation (Non-Medical):    Physical Activity:     Days of Exercise per Week:     Minutes of Exercise per Session:    Stress:     Feeling of Stress :    Social Connections:     Frequency of Communication with Friends and Family:     Frequency of Social Gatherings with Friends and Family:     Attends Bahai Services:     Active Member of Clubs or Organizations:     Attends Club or Organization Meetings:     Marital Status:    Intimate Partner Violence:     Fear of Current or Ex-Partner:     Emotionally Abused:     Physically Abused:     Sexually Abused:      No past medical history on file  No family history on file  No past surgical history on file  Current Outpatient Medications:     albuterol (2 5 mg/3 mL) 0 083 % nebulizer solution, Take 1 vial (2 5 mg total) by nebulization every 6 (six) hours as needed for wheezing or shortness of breath, Disp: 25 vial, Rfl: 3    albuterol (ProAir HFA) 90 mcg/act inhaler, Inhale 2 puffs every 6 (six) hours as needed for wheezing, Disp: 1 Inhaler, Rfl: 1    Brexpiprazole (Rexulti) 1 MG tablet, Take 1 mg by mouth daily, Disp: , Rfl:     cholecalciferol (VITAMIN D3) 25 mcg (1,000 units) tablet, Take by mouth, Disp: , Rfl:     desvenlafaxine (PRISTIQ) 100 mg 24 hr tablet, Take 100 mg by mouth daily, Disp: , Rfl:     lisdexamfetamine (VYVANSE) 30 MG capsule, Take 30 mg by mouth every morning, Disp: , Rfl:     loratadine (CLARITIN) 10 mg tablet, Take 1 tablet by mouth daily, Disp: , Rfl:     mometasone (Asmanex, 30 Metered Doses,) 220 MCG/INH inhaler, Inhale daily, Disp: , Rfl:     Omega-3 Fatty Acids (Fish Oil) 645 MG CAPS, Take by mouth, Disp: , Rfl:     Allergies   Allergen Reactions    Cefprozil Hives    Sulfa Antibiotics           Lab Review   No visits with results within 2 Month(s) from this visit     Latest known visit with results is:   Orders Only on 01/21/2021 Component Date Value    Glucose, Random 01/21/2021 95     BUN 01/21/2021 11     Creatinine 01/21/2021 0 93     eGFR Non  01/21/2021 109     eGFR  01/21/2021 126     SL AMB BUN/CREATININE RA* 01/21/2021 12     Sodium 01/21/2021 136     Potassium 01/21/2021 3 8     Chloride 01/21/2021 99     CO2 01/21/2021 26     CALCIUM 01/21/2021 9 6     Protein, Total 01/21/2021 7 1     Albumin 01/21/2021 4 9     Globulin, Total 01/21/2021 2 2     Albumin/Globulin Ratio 01/21/2021 2 2     TOTAL BILIRUBIN 01/21/2021 0 8     Alk Phos Isoenzymes 01/21/2021 67     AST 01/21/2021 18     ALT 01/21/2021 22     Cholesterol, Total 01/21/2021 181     Triglycerides 01/21/2021 130     HDL 01/21/2021 36*    VLDL Cholesterol Calcula* 01/21/2021 24     LDL Calculated 01/21/2021 121*    TSH 01/21/2021 0 764         Imaging: No results found  Objective:     Physical Exam  Constitutional:       General: He is not in acute distress  Appearance: He is well-developed  He is not ill-appearing  HENT:      Head: Normocephalic and atraumatic  Cardiovascular:      Rate and Rhythm: Normal rate and regular rhythm  Heart sounds: Normal heart sounds  Pulmonary:      Effort: Pulmonary effort is normal       Breath sounds: Normal breath sounds  Musculoskeletal:         General: Normal range of motion  Cervical back: Normal range of motion and neck supple  Skin:     General: Skin is warm and dry  Neurological:      Mental Status: He is alert and oriented to person, place, and time  Deep Tendon Reflexes: Reflexes are normal and symmetric  Psychiatric:         Behavior: Behavior normal          Thought Content: Thought content normal          Judgment: Judgment normal            There are no Patient Instructions on file for this visit  Madalyn Goldmann, CRNP    Portions of the record may have been created with voice recognition software    Occasional wrong word or "sound a like" substitutions may have occurred due to the inherent limitations of voice recognition software  Read the chart carefully and recognize, using context, where substitutions have occurred

## 2021-11-28 DIAGNOSIS — J45.909 UNCOMPLICATED ASTHMA, UNSPECIFIED ASTHMA SEVERITY, UNSPECIFIED WHETHER PERSISTENT: ICD-10-CM

## 2021-11-30 RX ORDER — ALBUTEROL SULFATE 90 UG/1
AEROSOL, METERED RESPIRATORY (INHALATION)
Qty: 8.5 G | Refills: 0 | Status: SHIPPED | OUTPATIENT
Start: 2021-11-30

## 2023-01-05 ENCOUNTER — OFFICE VISIT (OUTPATIENT)
Dept: FAMILY MEDICINE CLINIC | Facility: CLINIC | Age: 33
End: 2023-01-05

## 2023-01-05 VITALS
RESPIRATION RATE: 14 BRPM | HEIGHT: 68 IN | SYSTOLIC BLOOD PRESSURE: 138 MMHG | OXYGEN SATURATION: 97 % | WEIGHT: 231.6 LBS | HEART RATE: 78 BPM | BODY MASS INDEX: 35.1 KG/M2 | DIASTOLIC BLOOD PRESSURE: 100 MMHG | TEMPERATURE: 98.1 F

## 2023-01-05 DIAGNOSIS — J45.909 UNCOMPLICATED ASTHMA, UNSPECIFIED ASTHMA SEVERITY, UNSPECIFIED WHETHER PERSISTENT: ICD-10-CM

## 2023-01-05 DIAGNOSIS — J45.21 MILD INTERMITTENT ASTHMA WITH EXACERBATION: Primary | ICD-10-CM

## 2023-01-05 DIAGNOSIS — R05.1 ACUTE COUGH: ICD-10-CM

## 2023-01-05 RX ORDER — IPRATROPIUM BROMIDE AND ALBUTEROL SULFATE 2.5; .5 MG/3ML; MG/3ML
3 SOLUTION RESPIRATORY (INHALATION) ONCE
Qty: 30 ML | Refills: 1 | Status: SHIPPED | OUTPATIENT
Start: 2023-01-05 | End: 2023-01-05

## 2023-01-05 RX ORDER — INHALER, ASSIST DEVICES
SPACER (EA) MISCELLANEOUS 4 TIMES DAILY PRN
Qty: 1 DEVICE | Refills: 0 | Status: SHIPPED | OUTPATIENT
Start: 2023-01-05

## 2023-01-05 RX ORDER — IPRATROPIUM BROMIDE AND ALBUTEROL SULFATE 2.5; .5 MG/3ML; MG/3ML
3 SOLUTION RESPIRATORY (INHALATION) ONCE
Status: COMPLETED | OUTPATIENT
Start: 2023-01-05 | End: 2023-01-05

## 2023-01-05 RX ORDER — PHENOL 1.4 %
AEROSOL, SPRAY (ML) MUCOUS MEMBRANE
COMMUNITY

## 2023-01-05 RX ORDER — ALBUTEROL SULFATE 2.5 MG/3ML
2.5 SOLUTION RESPIRATORY (INHALATION) EVERY 6 HOURS PRN
Qty: 30 ML | Refills: 1 | Status: SHIPPED | OUTPATIENT
Start: 2023-01-05

## 2023-01-05 RX ORDER — IPRATROPIUM BROMIDE AND ALBUTEROL SULFATE 2.5; .5 MG/3ML; MG/3ML
3 SOLUTION RESPIRATORY (INHALATION)
Status: DISCONTINUED | OUTPATIENT
Start: 2023-01-05 | End: 2023-01-05

## 2023-01-05 RX ORDER — IPRATROPIUM BROMIDE AND ALBUTEROL SULFATE 2.5; .5 MG/3ML; MG/3ML
3 SOLUTION RESPIRATORY (INHALATION) EVERY 8 HOURS PRN
Qty: 30 ML | Refills: 1 | Status: SHIPPED | OUTPATIENT
Start: 2023-01-05

## 2023-01-05 RX ORDER — IPRATROPIUM BROMIDE AND ALBUTEROL SULFATE 2.5; .5 MG/3ML; MG/3ML
3 SOLUTION RESPIRATORY (INHALATION) EVERY 8 HOURS PRN
Qty: 30 ML | Refills: 1 | Status: SHIPPED | OUTPATIENT
Start: 2023-01-05 | End: 2023-01-05

## 2023-01-05 RX ORDER — PREDNISONE 20 MG/1
40 TABLET ORAL DAILY
Qty: 10 TABLET | Refills: 0 | Status: SHIPPED | OUTPATIENT
Start: 2023-01-05 | End: 2023-01-10

## 2023-01-05 RX ORDER — ALBUTEROL SULFATE 90 UG/1
2 AEROSOL, METERED RESPIRATORY (INHALATION) EVERY 4 HOURS PRN
Qty: 8.5 G | Refills: 0 | Status: SHIPPED | OUTPATIENT
Start: 2023-01-05

## 2023-01-05 RX ADMIN — IPRATROPIUM BROMIDE AND ALBUTEROL SULFATE 3 ML: 2.5; .5 SOLUTION RESPIRATORY (INHALATION) at 13:19

## 2023-01-05 NOTE — PATIENT INSTRUCTIONS
Asthma   WHAT YOU NEED TO KNOW:   Asthma is a lung disease that makes breathing difficult  Chronic inflammation and reactions to triggers narrow the airways in the lungs  Asthma can become life-threatening if it is not managed  DISCHARGE INSTRUCTIONS:   Call your local emergency number (911 in the [address] [facility]) if:   You have severe shortness of breath  The skin around your neck and ribs pulls in with each breath  Your peak flow numbers are in the red zone of your AAP  Return to the emergency department if:   You have shortness of breath, even after you take your short-term medicine as directed  Your lips or nails turn blue or gray  Call your doctor or asthma specialist if:   You run out of medicine before your next refill is due  Your symptoms get worse  You need to take more medicine than usual to control your symptoms  You have questions or concerns about your condition or care  Medicines:   Medicines  may be used to decrease inflammation, open airways, and make it easier to breathe  Medicines may be inhaled, taken as a pill, or injected  Short-term medicines relieve your symptoms quickly  Long-term medicines are used to prevent future asthma attacks  Other medicines may be needed if your regular medicines are not able to prevent attacks  You may also need medicine to help control your allergies  Ask your healthcare provider for more information about any medicine you are given and how to take it safely  Take your medicine as directed  Contact your healthcare provider if you think your medicine is not helping or if you have side effects  Tell him of her if you are allergic to any medicine  Keep a list of the medicines, vitamins, and herbs you take  Include the amounts, and when and why you take them  Bring the list or the pill bottles to follow-up visits  Carry your medicine list with you in case of an emergency  Manage your symptoms and prevent future attacks:    Follow your Asthma Action Plan (AAP)  This is a written plan that you and your healthcare provider create  It explains which medicine you need and when to change doses if necessary  It also explains how you can monitor symptoms and use a peak flow meter  The meter measures how well your lungs are working  Manage other health conditions , such as allergies, acid reflux, and sleep apnea  Identify and avoid triggers  These may include pets, dust mites, mold, and cockroaches  Do not smoke or be around others who smoke  Nicotine and other chemicals in cigarettes and cigars can cause lung damage  Ask your healthcare provider for information if you currently smoke and need help to quit  E-cigarettes or smokeless tobacco still contain nicotine  Talk to your healthcare provider before you use these products  Ask about the flu vaccine  The flu can make your asthma worse  You may need a yearly flu shot  Follow up with your healthcare provider as directed: You will need to return to make sure your medicine is working and your symptoms are controlled  You may be referred to an asthma or allergy specialist  Gilbert Amos may be asked to keep a record of your peak flow values and bring it with you to your appointments  Write down your questions so you remember to ask them during your visits  © Copyright [employer] 2022 Information is for End User's use only and may not be sold, redistributed or otherwise used for commercial purposes  All illustrations and images included in CareNotes® are the copyrighted property of [employer]  or Tosin Weaver  The above information is an  only  It is not intended as medical advice for individual conditions or treatments  Talk to your doctor, nurse or pharmacist before following any medical regimen to see if it is safe and effective for you

## 2023-01-05 NOTE — PROGRESS NOTES
Assessment/Plan:       Problem List Items Addressed This Visit        Respiratory    Asthma     Continue with albuterol inhaler and nebulizer solution as needed  Will add DuoNeb  Let me know if your symptoms persist as we may consider adding Singulair daily  Relevant Medications    albuterol (PROVENTIL HFA,VENTOLIN HFA) 90 mcg/act inhaler    albuterol (2 5 mg/3 mL) 0 083 % nebulizer solution    Spacer/Aero-Holding Chambers (OptiChamber Connie) SANDOR    ipratropium-albuterol (DUO-NEB) 0 5-2 5 mg/3 mL nebulizer solution    ipratropium-albuterol (DUO-NEB) 0 5-2 5 mg/3 mL inhalation solution 3 mL (Completed)    Other Relevant Orders    Mini neb   Other Visit Diagnoses     Mild intermittent asthma with exacerbation    -  Primary    Start taking prednisone 40 mg daily, continue to use albuterol inhaler and nebulizer solution as needed, will add DuoNeb  Relevant Medications    albuterol (PROVENTIL HFA,VENTOLIN HFA) 90 mcg/act inhaler    albuterol (2 5 mg/3 mL) 0 083 % nebulizer solution    predniSONE 20 mg tablet    ipratropium-albuterol (DUO-NEB) 0 5-2 5 mg/3 mL nebulizer solution    ipratropium-albuterol (DUO-NEB) 0 5-2 5 mg/3 mL inhalation solution 3 mL (Completed)    Acute cough        Start taking steroids 40 mg for 5 days    Relevant Medications    predniSONE 20 mg tablet            Subjective:      Patient ID: Snow Pedroza is a 34 y o  male  Patient presents to the office complaining of persistent chest tightness and cough that started few weeks ago  He does have history of mild intermittent asthma  He has been using his inhaler and nebulizer solution but no relief  Denying any chest pain, shortness of breath or wheezing         The following portions of the patient's history were reviewed and updated as appropriate:   Past Medical History:  He has no past medical history on file ,  _______________________________________________________________________  Medical Problems:  does not have any pertinent problems on file ,  _______________________________________________________________________  Past Surgical History:   has no past surgical history on file ,  _______________________________________________________________________  Family History:  family history is not on file ,  _______________________________________________________________________  Social History:   reports that he has never smoked  He has never used smokeless tobacco  No history on file for alcohol use and drug use ,  _______________________________________________________________________  Allergies:  is allergic to cefprozil and sulfa antibiotics     _______________________________________________________________________  Current Outpatient Medications   Medication Sig Dispense Refill   • albuterol (2 5 mg/3 mL) 0 083 % nebulizer solution Take 3 mL (2 5 mg total) by nebulization every 6 (six) hours as needed for wheezing or shortness of breath 30 mL 1   • albuterol (PROVENTIL HFA,VENTOLIN HFA) 90 mcg/act inhaler Inhale 2 puffs every 4 (four) hours as needed for wheezing 8 5 g 0   • Brexpiprazole (REXULTI) 1 MG tablet Take 1 mg by mouth daily     • cholecalciferol (VITAMIN D3) 25 mcg (1,000 units) tablet Take by mouth     • desvenlafaxine (PRISTIQ) 100 mg 24 hr tablet Take 100 mg by mouth daily     • ipratropium-albuterol (DUO-NEB) 0 5-2 5 mg/3 mL nebulizer solution Take 3 mL by nebulization every 8 (eight) hours as needed for wheezing or shortness of breath 30 mL 1   • lisdexamfetamine (VYVANSE) 30 MG capsule Take 30 mg by mouth every morning     • loratadine (CLARITIN) 10 mg tablet Take 1 tablet by mouth daily     • Melatonin 10 MG TABS Take by mouth Take 1 tablet by mouth in the am     • Omega-3 Fatty Acids (Fish Oil) 645 MG CAPS Take by mouth     • predniSONE 20 mg tablet Take 2 tablets (40 mg total) by mouth daily for 5 days 10 tablet 0   • Spacer/Aero-Holding Chambers (OptiChamber [employer]) SANDOR Use 4 (four) times a day as needed (wheezing) Use 4 times a day PRN with inhaler 1 Device 0     No current facility-administered medications for this visit      _______________________________________________________________________  Review of Systems   Constitutional: Negative for chills and fever  Respiratory: Positive for cough and chest tightness  Negative for shortness of breath  Cardiovascular: Negative for chest pain and palpitations  Gastrointestinal: Negative for abdominal pain  Genitourinary: Negative for dysuria and hematuria  Musculoskeletal: Negative for arthralgias and back pain  Neurological: Negative for headaches  All other systems reviewed and are negative  Objective:  Vitals:    01/05/23 1052   BP: 138/100   Pulse: 78   Resp: 14   Temp: 98 1 °F (36 7 °C)   SpO2: 97%   Weight: 105 kg (231 lb 9 6 oz)   Height: 5' 8" (1 727 m)     Body mass index is 35 21 kg/m²  Physical Exam  Vitals and nursing note reviewed  Constitutional:       General: He is not in acute distress  Appearance: Normal appearance  He is obese  He is not ill-appearing  Cardiovascular:      Rate and Rhythm: Normal rate and regular rhythm  Pulses: Normal pulses  Heart sounds: Normal heart sounds  Pulmonary:      Effort: Pulmonary effort is normal  No respiratory distress  Breath sounds: Decreased breath sounds present  No wheezing  Comments: Coughing during the exam  Musculoskeletal:         General: Normal range of motion  Skin:     General: Skin is warm and dry  Neurological:      Mental Status: He is alert and oriented to person, place, and time  Psychiatric:         Mood and Affect: Mood normal          Behavior: Behavior normal          Thought Content:  Thought content normal          Judgment: Judgment normal

## 2023-01-05 NOTE — ASSESSMENT & PLAN NOTE
Continue with albuterol inhaler and nebulizer solution as needed  Will add DuoNeb  Let me know if your symptoms persist as we may consider adding Singulair daily

## 2023-01-13 ENCOUNTER — OFFICE VISIT (OUTPATIENT)
Dept: FAMILY MEDICINE CLINIC | Facility: CLINIC | Age: 33
End: 2023-01-13

## 2023-01-13 VITALS
HEIGHT: 68 IN | WEIGHT: 233.4 LBS | DIASTOLIC BLOOD PRESSURE: 100 MMHG | BODY MASS INDEX: 35.37 KG/M2 | SYSTOLIC BLOOD PRESSURE: 140 MMHG | OXYGEN SATURATION: 98 % | HEART RATE: 70 BPM | RESPIRATION RATE: 16 BRPM | TEMPERATURE: 97.4 F

## 2023-01-13 DIAGNOSIS — J45.901 EXACERBATION OF ASTHMA, UNSPECIFIED ASTHMA SEVERITY, UNSPECIFIED WHETHER PERSISTENT: Primary | ICD-10-CM

## 2023-01-13 DIAGNOSIS — R05.1 ACUTE COUGH: ICD-10-CM

## 2023-01-13 RX ORDER — AZITHROMYCIN 250 MG/1
TABLET, FILM COATED ORAL
Qty: 6 TABLET | Refills: 0 | Status: SHIPPED | OUTPATIENT
Start: 2023-01-13 | End: 2023-01-18

## 2023-01-13 RX ORDER — PREDNISONE 10 MG/1
TABLET ORAL
Qty: 30 TABLET | Refills: 0 | Status: CANCELLED | OUTPATIENT
Start: 2023-01-13

## 2023-01-13 NOTE — PATIENT INSTRUCTIONS
Acute Cough   AMBULATORY CARE:   An acute cough  can last up to 3 weeks  Common causes of an acute cough include a cold, allergies, or a lung infection  Seek care immediately if:   You have trouble breathing or feel short of breath  You cough up blood, or you see blood in your mucus  You faint or feel weak or dizzy  You have chest pain when you cough or take a deep breath  You have new wheezing  Contact your healthcare provider if:   You have a fever  Your cough lasts longer than 4 weeks  Your symptoms do not improve with treatment  You have questions or concerns about your condition or care  Treatment:  An acute cough usually goes away on its own  Ask your healthcare provider about medicines you can take to decrease your cough  You may need medicine to stop the cough, decrease swelling in your airways, or help open your airways  Medicine may also be given to help you cough up mucus  If you have an infection caused by bacteria, you may need antibiotics  Manage your symptoms:   Do not smoke and stay away from others who smoke  Nicotine and other chemicals in cigarettes and cigars can cause lung damage and make your cough worse  Ask your healthcare provider for information if you currently smoke and need help to quit  E-cigarettes or smokeless tobacco still contain nicotine  Talk to your healthcare provider before you use these products  Drink extra liquids as directed  Liquids will help thin and loosen mucus so you can cough it up  Liquids will also help prevent dehydration  Examples of good liquids to drink include water, fruit juice, and broth  Do not drink liquids that contain caffeine  Caffeine can increase your risk for dehydration  Ask your healthcare provider how much liquid to drink each day  Rest as directed  Do not do activities that make your cough worse, such as exercise  Use a humidifier or vaporizer    Use a cool mist humidifier or a vaporizer to increase air moisture in your home  This may make it easier for you to breathe and help decrease your cough  Eat 2 to 5 mL of honey 2 times each day  Honey can help thin mucus and decrease your cough  Use cough drops or lozenges  These can help decrease throat irritation and your cough  Follow up with your healthcare provider as directed:  Write down your questions so you remember to ask them during your visits  © Copyright [employer] 2022 Information is for End User's use only and may not be sold, redistributed or otherwise used for commercial purposes  All illustrations and images included in CareNotes® are the copyrighted property of [employer]  or [address] [place], [place] [address]danica jamel   The above information is an  only  It is not intended as medical advice for individual conditions or treatments  Talk to your doctor, nurse or pharmacist before following any medical regimen to see if it is safe and effective for you

## 2023-01-13 NOTE — PROGRESS NOTES
BMI Counseling: Body mass index is 35 49 kg/m²  The BMI is above normal  Nutrition recommendations include decreasing portion sizes, encouraging healthy choices of fruits and vegetables, decreasing fast food intake, consuming healthier snacks, limiting drinks that contain sugar, moderation in carbohydrate intake, increasing intake of lean protein, reducing intake of saturated and trans fat and reducing intake of cholesterol  Exercise recommendations include moderate physical activity 150 minutes/week and exercising 3-5 times per week  Rationale for BMI follow-up plan is due to patient being overweight or obese  Assessment/Plan:     Problem List Items Addressed This Visit    None  Visit Diagnoses     Exacerbation of asthma, unspecified asthma severity, unspecified whether persistent    -  Primary    Patient requesting antibiotic  Relevant Medications    azithromycin (Zithromax) 250 mg tablet    Acute cough        Educated patient that cough is most likely viral and related to asthma exacerbation  Consider starting Singulair at bedtime time  Relevant Medications    azithromycin (Zithromax) 250 mg tablet            Subjective:      Patient ID: Chris Doyle is a 34 y o  male  Cough  This is a new problem  The current episode started 1 to 4 weeks ago  The problem has been waxing and waning  The cough is productive of sputum  Pertinent negatives include no chest pain, chills, ear congestion, ear pain, fever, headaches, heartburn, hemoptysis, myalgias, nasal congestion, postnasal drip, rash, rhinorrhea, sore throat, shortness of breath, sweats, weight loss or wheezing  The symptoms are aggravated by lying down  He has tried ipratropium inhaler and oral steroids for the symptoms  The treatment provided moderate relief  His past medical history is significant for asthma         The following portions of the patient's history were reviewed and updated as appropriate:   Past Medical History:  He has no past medical history on file ,  _______________________________________________________________________  Medical Problems:  does not have any pertinent problems on file ,  _______________________________________________________________________  Past Surgical History:   has no past surgical history on file ,  _______________________________________________________________________  Family History:  family history is not on file ,  _______________________________________________________________________  Social History:   reports that he has never smoked  He has never used smokeless tobacco  No history on file for alcohol use and drug use ,  _______________________________________________________________________  Allergies:  is allergic to cefprozil and sulfa antibiotics     _______________________________________________________________________  Current Outpatient Medications   Medication Sig Dispense Refill   • albuterol (2 5 mg/3 mL) 0 083 % nebulizer solution Take 3 mL (2 5 mg total) by nebulization every 6 (six) hours as needed for wheezing or shortness of breath 30 mL 1   • albuterol (PROVENTIL HFA,VENTOLIN HFA) 90 mcg/act inhaler Inhale 2 puffs every 4 (four) hours as needed for wheezing 8 5 g 0   • azithromycin (Zithromax) 250 mg tablet Take 2 tablets (500 mg total) by mouth daily for 1 day, THEN 1 tablet (250 mg total) daily for 4 days   6 tablet 0   • Brexpiprazole (REXULTI) 1 MG tablet Take 1 mg by mouth daily     • cholecalciferol (VITAMIN D3) 25 mcg (1,000 units) tablet Take by mouth     • desvenlafaxine (PRISTIQ) 100 mg 24 hr tablet Take 100 mg by mouth daily     • ipratropium-albuterol (DUO-NEB) 0 5-2 5 mg/3 mL nebulizer solution Take 3 mL by nebulization every 8 (eight) hours as needed for wheezing or shortness of breath 30 mL 1   • lisdexamfetamine (VYVANSE) 30 MG capsule Take 30 mg by mouth every morning     • loratadine (CLARITIN) 10 mg tablet Take 1 tablet by mouth daily     • Melatonin 10 MG TABS Take by mouth Take 1 tablet by mouth in the am     • Omega-3 Fatty Acids (Fish Oil) 645 MG CAPS Take by mouth     • Spacer/Aero-Holding Chambers (OptiChamber Steva Postin) SANDOR Use 4 (four) times a day as needed (wheezing) Use 4 times a day PRN with inhaler 1 Device 0     No current facility-administered medications for this visit      _______________________________________________________________________  Review of Systems   Constitutional: Negative for chills, fever and weight loss  HENT: Negative for ear pain, postnasal drip, rhinorrhea and sore throat  Respiratory: Positive for cough and chest tightness  Negative for hemoptysis, shortness of breath and wheezing  Cardiovascular: Negative for chest pain  Gastrointestinal: Negative for heartburn  Musculoskeletal: Negative for myalgias  Skin: Negative for rash  Neurological: Negative for headaches  Objective:  Vitals:    01/13/23 1003   BP: 140/100   Pulse: 70   Resp: 16   Temp: (!) 97 4 °F (36 3 °C)   SpO2: 98%   Weight: 106 kg (233 lb 6 4 oz)   Height: 5' 8" (1 727 m)     Body mass index is 35 49 kg/m²  Physical Exam  Vitals and nursing note reviewed  Constitutional:       General: He is not in acute distress  Appearance: Normal appearance  He is obese  He is not ill-appearing  Cardiovascular:      Rate and Rhythm: Normal rate and regular rhythm  Heart sounds: Normal heart sounds  Pulmonary:      Effort: Pulmonary effort is normal  No respiratory distress  Breath sounds: Decreased breath sounds present  No wheezing  Musculoskeletal:         General: Normal range of motion  Skin:     General: Skin is warm and dry  Neurological:      Mental Status: He is alert and oriented to person, place, and time  Psychiatric:         Mood and Affect: Mood normal          Behavior: Behavior normal          Thought Content:  Thought content normal          Judgment: Judgment normal

## 2023-01-24 DIAGNOSIS — J45.909 UNCOMPLICATED ASTHMA, UNSPECIFIED ASTHMA SEVERITY, UNSPECIFIED WHETHER PERSISTENT: ICD-10-CM

## 2023-01-24 RX ORDER — ALBUTEROL SULFATE 2.5 MG/3ML
2.5 SOLUTION RESPIRATORY (INHALATION) EVERY 6 HOURS PRN
Qty: 30 ML | Refills: 1 | Status: SHIPPED | OUTPATIENT
Start: 2023-01-24

## 2023-02-06 ENCOUNTER — OFFICE VISIT (OUTPATIENT)
Dept: FAMILY MEDICINE CLINIC | Facility: CLINIC | Age: 33
End: 2023-02-06

## 2023-02-06 VITALS
BODY MASS INDEX: 34.48 KG/M2 | HEART RATE: 78 BPM | SYSTOLIC BLOOD PRESSURE: 128 MMHG | OXYGEN SATURATION: 96 % | DIASTOLIC BLOOD PRESSURE: 90 MMHG | WEIGHT: 226.8 LBS | TEMPERATURE: 96.2 F

## 2023-02-06 DIAGNOSIS — Z00.00 ANNUAL PHYSICAL EXAM: Primary | ICD-10-CM

## 2023-02-06 DIAGNOSIS — Z11.4 SCREENING FOR HIV (HUMAN IMMUNODEFICIENCY VIRUS): ICD-10-CM

## 2023-02-06 DIAGNOSIS — F33.1 MODERATE EPISODE OF RECURRENT MAJOR DEPRESSIVE DISORDER (HCC): ICD-10-CM

## 2023-02-06 DIAGNOSIS — Z11.59 ENCOUNTER FOR HEPATITIS C SCREENING TEST FOR LOW RISK PATIENT: ICD-10-CM

## 2023-02-06 DIAGNOSIS — E78.2 MIXED HYPERLIPIDEMIA: ICD-10-CM

## 2023-02-06 DIAGNOSIS — J45.40 MODERATE PERSISTENT ASTHMA WITHOUT COMPLICATION: ICD-10-CM

## 2023-02-06 DIAGNOSIS — F90.9 ATTENTION DEFICIT HYPERACTIVITY DISORDER (ADHD), UNSPECIFIED ADHD TYPE: ICD-10-CM

## 2023-02-06 NOTE — PROGRESS NOTES
[facility] [address] [facility]    NAME: Osbaldo Agee  AGE: 34 y o  SEX: male  : 1990     DATE: 2023     Assessment and Plan:     Problem List Items Addressed This Visit        Respiratory    Asthma     Stable  Continue albuterol and DuoNebs as needed  Counseled on the avoidance of respiratory irritants  Other    ADHD (attention deficit hyperactivity disorder)     Stable  To continue Vyvanse 30 mg daily  Hyperlipemia     Counseled the importance of consuming heart healthy diet  To obtain labs, we will call with results  Depression     Stable  To continue Rexulti 1 mg and Pristiq 100 mg daily  To continue care with psychiatry  Annual physical exam - Primary   Other Visit Diagnoses     Screening for HIV (human immunodeficiency virus)        Relevant Orders    : HIV 1/2 AB/AG w Reflex [facility] for 2 yr old and above    Encounter for hepatitis C screening test for low risk patient        Relevant Orders    Hepatitis C antibody    BMI 34 0-34 9,adult        Relevant Orders    CBC and differential    Comprehensive metabolic panel    Hemoglobin A1C    Lipid Panel with Direct LDL reflex    TSH, 3rd generation with Free T4 reflex          Immunizations and preventive care screenings were discussed with patient today  Appropriate education was printed on patient's after visit summary  Counseling:  Alcohol/drug use: discussed moderation in alcohol intake, the recommendations for healthy alcohol use, and avoidance of illicit drug use  Dental Health: discussed importance of regular tooth brushing, flossing, and dental visits  Injury prevention: discussed safety/seat belts, safety helmets, smoke detectors, carbon dioxide detectors, and smoking near bedding or upholstery    Sexual health: discussed sexually transmitted diseases, partner selection, use of condoms, avoidance of unintended pregnancy, and contraceptive alternatives  · Exercise: the importance of regular exercise/physical activity was discussed  Recommend exercise 3-5 times per week for at least 30 minutes  No follow-ups on file  Chief Complaint:     Chief Complaint   Patient presents with   • Follow-up   • Physical Exam      History of Present Illness:     Adult Annual Physical   Patient here for a comprehensive physical exam  The patient reports problems - Requesting lab work  Diet and Physical Activity  · Diet/Nutrition: well balanced diet  · Exercise: no formal exercise  Depression Screening  PHQ-2/9 Depression Screening         General Health  · Sleep: sleeps well  · Hearing: normal - bilateral   · Vision: most recent eye exam >1 year ago and wears glasses  · Dental: no dental visits for >1 year   Health  · History of STDs?: no      Review of Systems:     Review of Systems   Constitutional: Negative  HENT: Negative  Eyes: Negative  Respiratory: Negative  Negative for chest tightness and shortness of breath  Cardiovascular: Negative  Negative for chest pain  Gastrointestinal: Negative  Endocrine: Negative  Genitourinary: Negative  Musculoskeletal: Negative  Skin: Negative  Allergic/Immunologic: Negative  Neurological: Negative  Hematological: Negative  Psychiatric/Behavioral: Negative  Past Medical History:     No past medical history on file  Past Surgical History:     No past surgical history on file     Social History:     Social History     Socioeconomic History   • Marital status: Single     Spouse name: Not on file   • Number of children: Not on file   • Years of education: Not on file   • Highest education level: Not on file   Occupational History   • Not on file   Tobacco Use   • Smoking status: Never   • Smokeless tobacco: Never   Substance and Sexual Activity   • Alcohol use: Not on file   • Drug use: Not on file   • Sexual activity: Not on file   Other Topics Concern   • Not on file   Social History Narrative   • Not on file     Social Determinants of Health     Financial Resource Strain: Not on file   Food Insecurity: Not on file   Transportation Needs: Not on file   Physical Activity: Not on file   Stress: Not on file   Social Connections: Not on file   Intimate Partner Violence: Not on file   Housing Stability: Not on file      Family History:     No family history on file  Current Medications:     Current Outpatient Medications   Medication Sig Dispense Refill   • albuterol (2 5 mg/3 mL) 0 083 % nebulizer solution Take 3 mL (2 5 mg total) by nebulization every 6 (six) hours as needed for wheezing or shortness of breath 30 mL 1   • albuterol (PROVENTIL HFA,VENTOLIN HFA) 90 mcg/act inhaler Inhale 2 puffs every 4 (four) hours as needed for wheezing 8 5 g 0   • Brexpiprazole (REXULTI) 1 MG tablet Take 1 mg by mouth daily     • cholecalciferol (VITAMIN D3) 25 mcg (1,000 units) tablet Take by mouth     • desvenlafaxine (PRISTIQ) 100 mg 24 hr tablet Take 100 mg by mouth daily     • ipratropium-albuterol (DUO-NEB) 0 5-2 5 mg/3 mL nebulizer solution Take 3 mL by nebulization every 8 (eight) hours as needed for wheezing or shortness of breath 30 mL 1   • lisdexamfetamine (VYVANSE) 30 MG capsule Take 30 mg by mouth every morning     • loratadine (CLARITIN) 10 mg tablet Take 1 tablet by mouth daily     • Melatonin 10 MG TABS Take by mouth Take 1 tablet by mouth in the am     • Omega-3 Fatty Acids (Fish Oil) 645 MG CAPS Take by mouth     • Spacer/Aero-Holding Chambers (OptiChamber Lupe Pinto) SANDOR Use 4 (four) times a day as needed (wheezing) Use 4 times a day PRN with inhaler 1 Device 0     No current facility-administered medications for this visit  Allergies:      Allergies   Allergen Reactions   • Cefprozil Hives   • Sulfa Antibiotics       Physical Exam:     /90 (BP Location: Right arm, Patient Position: Sitting, Cuff Size: Standard)   Pulse 78 Temp (!) 96 2 °F (35 7 °C)   Wt 103 kg (226 lb 12 8 oz)   SpO2 96%   BMI 34 48 kg/m²     Physical Exam  Vitals and nursing note reviewed  Constitutional:       General: He is not in acute distress  Appearance: Normal appearance  He is well-developed  He is not ill-appearing, toxic-appearing or diaphoretic  HENT:      Head: Normocephalic and atraumatic  Right Ear: Tympanic membrane and external ear normal       Left Ear: Tympanic membrane and external ear normal       Nose: Nose normal       Mouth/Throat:      Mouth: Mucous membranes are moist       Pharynx: Oropharynx is clear  No oropharyngeal exudate  Eyes:      General: Lids are normal       Conjunctiva/sclera: Conjunctivae normal       Pupils: Pupils are equal, round, and reactive to light  Cardiovascular:      Rate and Rhythm: Normal rate and regular rhythm  Heart sounds: No murmur heard  Pulmonary:      Effort: Pulmonary effort is normal  No respiratory distress  Breath sounds: Normal breath sounds  No stridor  No wheezing or rales  Chest:      Chest wall: No tenderness  Abdominal:      General: Bowel sounds are normal  There is no distension  Palpations: Abdomen is soft  There is no mass  Tenderness: There is no abdominal tenderness  There is no guarding or rebound  Hernia: No hernia is present  Musculoskeletal:         General: No deformity  Normal range of motion  Cervical back: Normal range of motion and neck supple  Lymphadenopathy:      Cervical: No cervical adenopathy  Skin:     General: Skin is warm and dry  Capillary Refill: Capillary refill takes less than 2 seconds  Neurological:      General: No focal deficit present  Mental Status: He is alert and oriented to person, place, and time  Psychiatric:         Mood and Affect: Mood normal          Behavior: Behavior normal          Thought Content:  Thought content normal          Judgment: Judgment normal           Ricardo Zuniga , [address] [facility]

## 2023-02-06 NOTE — ASSESSMENT & PLAN NOTE
Stable  Continue albuterol and DuoNebs as needed  Counseled on the avoidance of respiratory irritants

## 2023-02-06 NOTE — ASSESSMENT & PLAN NOTE
Counseled the importance of consuming heart healthy diet  To obtain labs, we will call with results

## 2023-02-06 NOTE — PATIENT INSTRUCTIONS

## 2023-02-14 DIAGNOSIS — E78.2 MIXED HYPERLIPIDEMIA: Primary | ICD-10-CM

## 2023-02-14 LAB
ALBUMIN SERPL-MCNC: 4.9 G/DL (ref 4–5)
ALBUMIN/GLOB SERPL: 2.1 {RATIO} (ref 1.2–2.2)
ALP SERPL-CCNC: 70 IU/L (ref 44–121)
ALT SERPL-CCNC: 35 IU/L (ref 0–44)
AST SERPL-CCNC: 25 IU/L (ref 0–40)
BASOPHILS # BLD AUTO: 0 X10E3/UL (ref 0–0.2)
BASOPHILS NFR BLD AUTO: 1 %
BILIRUB SERPL-MCNC: 0.6 MG/DL (ref 0–1.2)
BUN SERPL-MCNC: 13 MG/DL (ref 6–20)
BUN/CREAT SERPL: 14 (ref 9–20)
CALCIUM SERPL-MCNC: 9.9 MG/DL (ref 8.7–10.2)
CHLORIDE SERPL-SCNC: 101 MMOL/L (ref 96–106)
CHOLEST SERPL-MCNC: 230 MG/DL (ref 100–199)
CO2 SERPL-SCNC: 23 MMOL/L (ref 20–29)
CREAT SERPL-MCNC: 0.96 MG/DL (ref 0.76–1.27)
EGFR: 107 ML/MIN/1.73
EOSINOPHIL # BLD AUTO: 0 X10E3/UL (ref 0–0.4)
EOSINOPHIL NFR BLD AUTO: 1 %
ERYTHROCYTE [DISTWIDTH] IN BLOOD BY AUTOMATED COUNT: 13 % (ref 11.6–15.4)
GLOBULIN SER-MCNC: 2.3 G/DL (ref 1.5–4.5)
GLUCOSE SERPL-MCNC: 94 MG/DL (ref 70–99)
HBA1C MFR BLD: 5.4 % (ref 4.8–5.6)
HCT VFR BLD AUTO: 46.7 % (ref 37.5–51)
HCV AB S/CO SERPL IA: NON REACTIVE
HDLC SERPL-MCNC: 38 MG/DL
HGB BLD-MCNC: 16.2 G/DL (ref 13–17.7)
HIV 1+2 AB+HIV1 P24 AG SERPL QL IA: NON REACTIVE
IMM GRANULOCYTES # BLD: 0 X10E3/UL (ref 0–0.1)
IMM GRANULOCYTES NFR BLD: 0 %
LDLC SERPL CALC-MCNC: 137 MG/DL (ref 0–99)
LYMPHOCYTES # BLD AUTO: 1.3 X10E3/UL (ref 0.7–3.1)
LYMPHOCYTES NFR BLD AUTO: 31 %
MCH RBC QN AUTO: 30.1 PG (ref 26.6–33)
MCHC RBC AUTO-ENTMCNC: 34.7 G/DL (ref 31.5–35.7)
MCV RBC AUTO: 87 FL (ref 79–97)
MONOCYTES # BLD AUTO: 0.3 X10E3/UL (ref 0.1–0.9)
MONOCYTES NFR BLD AUTO: 7 %
NEUTROPHILS # BLD AUTO: 2.6 X10E3/UL (ref 1.4–7)
NEUTROPHILS NFR BLD AUTO: 60 %
PLATELET # BLD AUTO: 231 X10E3/UL (ref 150–450)
POTASSIUM SERPL-SCNC: 4.4 MMOL/L (ref 3.5–5.2)
PROT SERPL-MCNC: 7.2 G/DL (ref 6–8.5)
RBC # BLD AUTO: 5.39 X10E6/UL (ref 4.14–5.8)
SL AMB VLDL CHOLESTEROL CALC: 55 MG/DL (ref 5–40)
SODIUM SERPL-SCNC: 139 MMOL/L (ref 134–144)
TRIGL SERPL-MCNC: 305 MG/DL (ref 0–149)
TSH SERPL DL<=0.005 MIU/L-ACNC: 0.55 UIU/ML (ref 0.45–4.5)
WBC # BLD AUTO: 4.3 X10E3/UL (ref 3.4–10.8)